# Patient Record
Sex: MALE | Race: WHITE | NOT HISPANIC OR LATINO | Employment: FULL TIME | URBAN - METROPOLITAN AREA
[De-identification: names, ages, dates, MRNs, and addresses within clinical notes are randomized per-mention and may not be internally consistent; named-entity substitution may affect disease eponyms.]

---

## 2017-01-06 ENCOUNTER — ALLSCRIPTS OFFICE VISIT (OUTPATIENT)
Dept: OTHER | Facility: OTHER | Age: 27
End: 2017-01-06

## 2017-12-05 ENCOUNTER — OFFICE VISIT (OUTPATIENT)
Dept: URGENT CARE | Facility: MEDICAL CENTER | Age: 27
End: 2017-12-05
Payer: COMMERCIAL

## 2017-12-05 PROCEDURE — 99213 OFFICE O/P EST LOW 20 MIN: CPT

## 2017-12-09 NOTE — PROGRESS NOTES
Assessment    1  Acute URI (465 9) (J06 9)    Plan  Acute URI    · Start: Azithromycin 250 MG Oral Tablet; TAKE 2 TABLETS ON DAY 1 THEN TAKE 1TABLET A DAY FOR 4 DAYS   · Start: Bromfed DM 30-2-10 MG/5ML Oral Syrup; SWALLOW 10 ML Every 6 hours PRN Forcough/congestion    Discussion/Summary  Discussion Summary:   Take medications as prescribed and as directed  fluids and rest  Warm saltwater gargles, hot tea with honey and lemon, throat lozenges, Chloraseptic spray as needed for sore throat relief  Tylenol and Advil as needed for fever and chills  Monitor for severe worsening of current symptoms, difficulty breathing, swallowing, uncontrollable fever or chills  With these symptoms follow up with PCP or ER immediately  Otherwise follow-up with PCP in 3-5 days if symptoms are not improving  Medication Side Effects Reviewed: Possible side effects of new medications were reviewed with the patient/guardian today  Understands and agrees with treatment plan: The treatment plan was reviewed with the patient/guardian  The patient/guardian understands and agrees with the treatment plan   Counseling Documentation With Imm: The patient, patient's family was counseled regarding instructions for management  Follow Up Instructions: Follow Up with your Primary Care Provider in 3-5 days  If your symptoms worsen, go to the nearest [place] [address] Emergency Department  Chief Complaint  Chief Complaint Free Text Note Form: Cold sx x 1 5week  History of Present Illness  HPI: 39-year-old male with a past medical history of asthma presents for evaluation of sore throat, productive cough x1 and half weeks  Patient is currently taking Dorisann Crooked as a maintenance medication for his asthma  He has not needed to use his rescue inhaler  Patient states that he has per been producing a productive green/ yellow mucus  Has been feeling short of breath and chest congestion  Denies any fevers, nasal congestion, nasal tenderness, runny nose   He states that he does have a sore throat, which is exacerbated with coughing  Patient denies any wheezing episodes Or symptoms of respiratory distress  He denies any chest pain, abdominal pain, nausea/ vomiting [facility] Based Practices Required Assessment:  Pain Assessment  the patient states they have pain  The pain is located in the throat  (on a scale of 0 to 10, the patient rates the pain at 6 ) Reason DV Screen not done: not alone       Review of Systems  Focused-Male:  Constitutional: fever, but-- no chills  ENT: sore throat, but-- no earache-- and-- no nasal discharge  Cardiovascular: no chest pain-- and-- no palpitations  Respiratory: shortness of breath-- and-- cough, but-- no wheezing  Gastrointestinal: no abdominal pain,-- no nausea,-- no vomiting-- and-- no diarrhea  ROS Reviewed:   ROS reviewed  Active Problems  1  Allergic rhinitis (477 9) (J30 9)  2  Eustachian tube dysfunction (381 81) (H69 80)  3  Moderate persistent asthma (493 90) (J45 40)  4  Otitis media (382 9) (H66 90)  5  Sore throat (462) (J02 9)    Past Medical History  1  History of Acute bronchitis (466 0) (J20 9)  2  History of Acute sinusitis (461 9) (J01 90)  Active Problems And Past Medical History Reviewed: The active problems and past medical history were reviewed and updated today  Family History  Family History   1  No family history of mental disorder  Family History Reviewed: The family history was reviewed and updated today  Social History     · Denied: History of Alcohol Use (History)   · Denied: History of Drug Use   · Former smoker (V15 82) (J29 179)   · Never a smoker   · No alcohol use   · No drug use   · No secondhand smoke exposure (V49 89) (Z78 9)  Social History Reviewed: The social history was reviewed and updated today  The social history was reviewed and is unchanged  Surgical History  Surgical History Reviewed: The surgical history was reviewed and updated today  Current Meds  1  Azelastine HCl - 0 1 % Nasal Solution; USE 2 SPRAYS IN EACH       NOSTRIL TWICE DAILY; Therapy: [id number] to (Evaluate:04Jan2018)  Requested for: 34QOO5695; Last Rx:09Jan2017 Ordered  2  Dulera 200-5 MCG/ACT Inhalation Aerosol; INHALE 2 PUFFS TWICE DAILY  RINSE MOUTH AFTER USE; Therapy: [id number] to (Evaluate:04Jan2018)  Requested for: 03JFK8217; Last Rx:09Jan2017 Ordered  3  Montelukast Sodium 10 MG Oral Tablet; Take 1 tablet daily; Therapy: 65JAY5390 to (LDFORJDO:75TTM1289)  Requested for: 20TSY1987; Last Rx:09Jan2017 Ordered  4  ProAir  (90 Base) MCG/ACT Inhalation Aerosol Solution; INHALE 1 TO 2 PUFFS EVERY 4 TO 6 HOURS AS NEEDED; Therapy: 59KPA1511 to (Last EN:02QTX6644)  Requested for: 65PQW6773 Ordered  5  Zyrtec 10 MG TABS; Take 1 tablet daily Recorded  Medication List Reviewed: The medication list was reviewed and updated today  Allergies    1  No Known Drug Allergies    Vitals  Signs   Recorded: 00RGZ3343 01:52PM   Temperature: 97 1 F  Heart Rate: 80  Respiration: 18  Systolic: 969  Diastolic: 84  BP Cuff Size: Extra-Large  BP Comments: manual  Height: 6 ft 0 5 in  Weight: 230 lb   BMI Calculated: 30 77  BSA Calculated: 2 27  O2 Saturation: 98  Pain Scale: 6    Physical Exam   Constitutional  General appearance: No acute distress, well appearing and well nourished  Eyes  Conjunctiva and lids: No swelling, erythema, or discharge  Pupils and irises: Equal, round and reactive to light  Ears, Nose, Mouth, and Throat  External inspection of ears and nose: Normal    Otoscopic examination: Tympanic membrance translucent with normal light reflex  Canals patent without erythema  Nasal mucosa, septum, and turbinates: Normal without edema or erythema  Oropharynx: Normal with no erythema, edema, exudate or lesions  Pulmonary  Respiratory effort: No increased work of breathing or signs of respiratory distress  Auscultation of lungs: Clear to auscultation     Cardiovascular Palpation of heart: Normal PMI, no thrills  Auscultation of heart: Normal rate and rhythm, normal S1 and S2, without murmurs  Psychiatric  Orientation to person, place and time: Normal    Mood and affect: Normal        Message  Return to work or school:   Adrian Nicole is under my professional care  He was seen in my office on 12/05/2017   He is able to return to work on  12/06/2017      Khalida Nolen PA-C        Signatures   Electronically signed by : Khalida Nolen, [facility]; Dec  5 2017  2:22PM EST                       (Author)    Electronically signed by : TEMO Patton ; Dec  8 2017  8:46PM EST

## 2018-01-15 VITALS
SYSTOLIC BLOOD PRESSURE: 124 MMHG | HEIGHT: 73 IN | BODY MASS INDEX: 33.17 KG/M2 | DIASTOLIC BLOOD PRESSURE: 76 MMHG | WEIGHT: 250.25 LBS | HEART RATE: 76 BPM

## 2018-01-23 VITALS
BODY MASS INDEX: 30.48 KG/M2 | OXYGEN SATURATION: 98 % | WEIGHT: 230 LBS | HEART RATE: 80 BPM | HEIGHT: 73 IN | DIASTOLIC BLOOD PRESSURE: 84 MMHG | SYSTOLIC BLOOD PRESSURE: 114 MMHG | RESPIRATION RATE: 18 BRPM | TEMPERATURE: 97.1 F

## 2018-01-24 NOTE — MISCELLANEOUS
Message  Return to work or school:   Ashly Turcios is under my professional care  He was seen in my office on 12/05/2017   He is able to return to work on  12/06/2017       Cholo Jennings PA-C        Signatures   Electronically signed by : Cholo Jennings [facility]; Dec  5 2017  2:22PM EST                       (Author)    Electronically signed by : Cholo Jennings [facility]; Dec  5 2017  2:36PM EST                       (Author)

## 2018-02-18 DIAGNOSIS — J45.30 MILD PERSISTENT ASTHMA, UNSPECIFIED WHETHER COMPLICATED: Primary | ICD-10-CM

## 2018-02-18 PROBLEM — J45.40 MODERATE PERSISTENT ASTHMA: Status: ACTIVE | Noted: 2017-01-06

## 2018-02-18 RX ORDER — MOMETASONE FUROATE AND FORMOTEROL FUMARATE DIHYDRATE 200; 5 UG/1; UG/1
AEROSOL RESPIRATORY (INHALATION)
Qty: 39 G | Refills: 3 | Status: SHIPPED | OUTPATIENT
Start: 2018-02-18 | End: 2018-02-20 | Stop reason: CLARIF

## 2018-02-20 ENCOUNTER — TELEPHONE (OUTPATIENT)
Dept: FAMILY MEDICINE CLINIC | Facility: CLINIC | Age: 28
End: 2018-02-20

## 2018-02-20 DIAGNOSIS — J45.909 ASTHMA, UNSPECIFIED ASTHMA SEVERITY, UNSPECIFIED WHETHER COMPLICATED, UNSPECIFIED WHETHER PERSISTENT: Primary | ICD-10-CM

## 2018-02-20 RX ORDER — BUDESONIDE AND FORMOTEROL FUMARATE DIHYDRATE 160; 4.5 UG/1; UG/1
2 AEROSOL RESPIRATORY (INHALATION) 2 TIMES DAILY
Qty: 3 INHALER | Refills: 0 | Status: SHIPPED | OUTPATIENT
Start: 2018-02-20 | End: 2018-06-09 | Stop reason: SDUPTHER

## 2018-02-20 NOTE — TELEPHONE ENCOUNTER
I would recommend Symbicort 160 mcg inhaler, 2 puffs twice daily in place of the [facility]

## 2018-03-08 ENCOUNTER — OFFICE VISIT (OUTPATIENT)
Dept: FAMILY MEDICINE CLINIC | Facility: CLINIC | Age: 28
End: 2018-03-08
Payer: COMMERCIAL

## 2018-03-08 VITALS
TEMPERATURE: 97 F | DIASTOLIC BLOOD PRESSURE: 72 MMHG | BODY MASS INDEX: 29.16 KG/M2 | WEIGHT: 247 LBS | RESPIRATION RATE: 16 BRPM | HEIGHT: 77 IN | SYSTOLIC BLOOD PRESSURE: 120 MMHG

## 2018-03-08 DIAGNOSIS — R53.83 FATIGUE, UNSPECIFIED TYPE: ICD-10-CM

## 2018-03-08 DIAGNOSIS — R42 LIGHTHEADED: ICD-10-CM

## 2018-03-08 DIAGNOSIS — A08.4 VIRAL GASTROENTERITIS: Primary | ICD-10-CM

## 2018-03-08 DIAGNOSIS — R11.2 NAUSEA AND VOMITING, INTRACTABILITY OF VOMITING NOT SPECIFIED, UNSPECIFIED VOMITING TYPE: ICD-10-CM

## 2018-03-08 PROCEDURE — 99214 OFFICE O/P EST MOD 30 MIN: CPT | Performed by: FAMILY MEDICINE

## 2018-03-08 PROCEDURE — 1036F TOBACCO NON-USER: CPT | Performed by: FAMILY MEDICINE

## 2018-03-08 PROCEDURE — 3008F BODY MASS INDEX DOCD: CPT | Performed by: FAMILY MEDICINE

## 2018-03-08 RX ORDER — ONDANSETRON 8 MG/1
8 TABLET, ORALLY DISINTEGRATING ORAL EVERY 8 HOURS PRN
Qty: 10 TABLET | Refills: 0 | Status: SHIPPED | OUTPATIENT
Start: 2018-03-08 | End: 2019-01-24 | Stop reason: ALTCHOICE

## 2018-03-08 RX ORDER — CETIRIZINE HYDROCHLORIDE 10 MG/1
1 TABLET ORAL DAILY
COMMUNITY

## 2018-03-08 RX ORDER — MONTELUKAST SODIUM 10 MG/1
1 TABLET ORAL DAILY
COMMUNITY
Start: 2016-03-03 | End: 2019-10-18 | Stop reason: SDUPTHER

## 2018-03-08 RX ORDER — AZELASTINE 1 MG/ML
2 SPRAY, METERED NASAL 2 TIMES DAILY
COMMUNITY
Start: 2016-05-18

## 2018-03-08 NOTE — LETTER
March 8, 2018     Patient: Narcisa Mancilla   YOB: 1990   Date of Visit: 3/8/2018       To Whom it May Concern: Narcisa Mancilla is under my professional care  He was seen in my office on 3/8/2018  He may return to work on 03/12/2018  If you have any questions or concerns, please don't hesitate to call           Sincerely,          Tito Ventura DO        CC: No Recipients

## 2018-03-08 NOTE — PROGRESS NOTES
Assessment/Plan:  1  Acute viral gastroenteritis, increase p o  fluids, clear liquids, patient use Tylenol if needed  2  Nausea vomiting, Zofran was ordered patient use half strained Gatorade for 24 hours advanced diet as tolerated  3  Fatigue, secondary to above  4  Lightheaded, secondary to above patient should feel better with more fluids  5  Allergic rhinitis, OTC Zyrtec can be used  6  Patient to return in 3-4 days if still symptoms, symptoms worsen report to [address]  Allergic rhinitis  May use OTC Zyrtec if needed       Diagnoses and all orders for this visit:    Viral gastroenteritis  -     ondansetron (ZOFRAN-ODT) 8 mg disintegrating tablet; Take 1 tablet (8 mg total) by mouth every 8 (eight) hours as needed for nausea or vomiting for up to 3 days    Fatigue, unspecified type  -     ondansetron (ZOFRAN-ODT) 8 mg disintegrating tablet; Take 1 tablet (8 mg total) by mouth every 8 (eight) hours as needed for nausea or vomiting for up to 3 days    Nausea and vomiting, intractability of vomiting not specified, unspecified vomiting type  -     ondansetron (ZOFRAN-ODT) 8 mg disintegrating tablet; Take 1 tablet (8 mg total) by mouth every 8 (eight) hours as needed for nausea or vomiting for up to 3 days    Lightheaded  -     ondansetron (ZOFRAN-ODT) 8 mg disintegrating tablet; Take 1 tablet (8 mg total) by mouth every 8 (eight) hours as needed for nausea or vomiting for up to 3 days          Subjective:      Patient ID: Torito Clement is a 33 y o  male  Cc: nausea, vomiting, fatigue, dizzy x 4 days  [facility]    Patient for the last 4 days his fatigue with nausea and vomiting denies fever chills no abdominal pain diarrhea  No medication has been taken  Has been getting mildly lightheaded at times    But no syncope or near-syncope no headache or true vertigo        The following portions of the patient's history were reviewed and updated as appropriate: allergies, current medications, past family history, past medical history, past social history, past surgical history and problem list     Review of Systems   Constitutional:        HPI   HENT: Positive for rhinorrhea and sneezing  Eyes: Negative  Respiratory: Negative  Cardiovascular: Negative  Gastrointestinal:        HPI   Genitourinary: Negative  Musculoskeletal: Negative  Allergic/Immunologic: Negative  Neurological:        HPI   Psychiatric/Behavioral: Negative  Objective:      Vitals:    03/08/18 1837 03/08/18 1843   BP: 120/72    BP Location: Left arm    Patient Position: Sitting    Cuff Size: Large    Resp: 16    Temp:  (!) 97 °F (36 1 °C)   Weight: 112 kg (247 lb)    Height: 6' 5" (1 956 m)           Physical Exam   Constitutional: He is oriented to person, place, and time  He appears well-developed and well-nourished  HENT:   Head: Normocephalic and atraumatic  Right Ear: External ear normal    Left Ear: External ear normal    Mouth/Throat: No oropharyngeal exudate  Positive allergic turbinates negative sinus tenderness to percussion scant clear postnasal drip negative pharyngeal injection or exudates negative cervical adenopathy   Eyes: Conjunctivae are normal  Pupils are equal, round, and reactive to light  No scleral icterus  Neck: Neck supple  Cardiovascular: Normal rate and regular rhythm  Pulmonary/Chest: Effort normal and breath sounds normal    Abdominal: Soft  Bowel sounds are normal  He exhibits no distension and no mass  There is no tenderness  There is no rebound and no guarding  Musculoskeletal: He exhibits no edema  Lymphadenopathy:     He has no cervical adenopathy  Neurological: He is alert and oriented to person, place, and time  No cranial nerve deficit  He exhibits normal muscle tone  Coordination normal    Skin: Skin is warm and dry  Psychiatric: He has a normal mood and affect

## 2018-03-08 NOTE — PATIENT INSTRUCTIONS
Clear liquids for 24 hours, 1/2 strength Gatorade, if not improving in 4872 hours call office, if worsen report to [facility]

## 2018-06-09 DIAGNOSIS — J45.909 ASTHMA, UNSPECIFIED ASTHMA SEVERITY, UNSPECIFIED WHETHER COMPLICATED, UNSPECIFIED WHETHER PERSISTENT: ICD-10-CM

## 2018-06-11 RX ORDER — BUDESONIDE AND FORMOTEROL FUMARATE DIHYDRATE 160; 4.5 UG/1; UG/1
AEROSOL RESPIRATORY (INHALATION)
Qty: 30.6 G | Refills: 0 | Status: SHIPPED | OUTPATIENT
Start: 2018-06-11 | End: 2018-10-01 | Stop reason: SDUPTHER

## 2018-10-01 DIAGNOSIS — J45.909 ASTHMA, UNSPECIFIED ASTHMA SEVERITY, UNSPECIFIED WHETHER COMPLICATED, UNSPECIFIED WHETHER PERSISTENT: ICD-10-CM

## 2018-10-01 RX ORDER — BUDESONIDE AND FORMOTEROL FUMARATE DIHYDRATE 160; 4.5 UG/1; UG/1
2 AEROSOL RESPIRATORY (INHALATION) 2 TIMES DAILY
Qty: 30.6 G | Refills: 1 | Status: SHIPPED | OUTPATIENT
Start: 2018-10-01 | End: 2019-01-24 | Stop reason: SDUPTHER

## 2019-01-22 ENCOUNTER — OFFICE VISIT (OUTPATIENT)
Dept: URGENT CARE | Age: 29
End: 2019-01-22
Payer: COMMERCIAL

## 2019-01-22 VITALS
DIASTOLIC BLOOD PRESSURE: 78 MMHG | HEIGHT: 72 IN | BODY MASS INDEX: 32.1 KG/M2 | OXYGEN SATURATION: 96 % | WEIGHT: 237 LBS | TEMPERATURE: 99.5 F | HEART RATE: 72 BPM | RESPIRATION RATE: 18 BRPM | SYSTOLIC BLOOD PRESSURE: 131 MMHG

## 2019-01-22 DIAGNOSIS — J20.9 ACUTE BRONCHITIS, UNSPECIFIED ORGANISM: Primary | ICD-10-CM

## 2019-01-22 DIAGNOSIS — J45.901 ASTHMA WITH ACUTE EXACERBATION, UNSPECIFIED ASTHMA SEVERITY, UNSPECIFIED WHETHER PERSISTENT: ICD-10-CM

## 2019-01-22 PROCEDURE — G0382 LEV 3 HOSP TYPE B ED VISIT: HCPCS | Performed by: PHYSICIAN ASSISTANT

## 2019-01-22 RX ORDER — BROMPHENIRAMINE MALEATE, PSEUDOEPHEDRINE HYDROCHLORIDE, AND DEXTROMETHORPHAN HYDROBROMIDE 2; 30; 10 MG/5ML; MG/5ML; MG/5ML
5 SYRUP ORAL 4 TIMES DAILY PRN
Qty: 120 ML | Refills: 0 | Status: SHIPPED | OUTPATIENT
Start: 2019-01-22 | End: 2019-09-23

## 2019-01-22 RX ORDER — PREDNISONE 20 MG/1
20 TABLET ORAL DAILY
Qty: 5 TABLET | Refills: 0 | Status: SHIPPED | OUTPATIENT
Start: 2019-01-22 | End: 2019-01-27

## 2019-01-22 RX ORDER — GUAIFENESIN 600 MG
600 TABLET, EXTENDED RELEASE 12 HR ORAL EVERY 12 HOURS SCHEDULED
Qty: 20 TABLET | Refills: 0 | Status: SHIPPED | OUTPATIENT
Start: 2019-01-22 | End: 2020-10-12 | Stop reason: ALTCHOICE

## 2019-01-22 NOTE — LETTER
January 22, 2019     Patient: Kevin Torres   YOB: 1990   Date of Visit: 1/22/2019       To Whom It May Concern: It is my medical opinion that Kevin Torres may return to work on 1/24/2019  If you have any questions or concerns, please don't hesitate to call           Sincerely,        Karli Maddox PA-C    CC: No Recipients

## 2019-01-23 NOTE — PATIENT INSTRUCTIONS
Take Bromfed DM, prednisone and mucinex as prescribed  Fluids and rest (Warm water with honey and lemon)  Tylenol/Ibuprofen for pain fever  Follow up with PCP in 3-5 days  Proceed to  ER if symptoms worsen  Acute Bronchitis   WHAT YOU NEED TO KNOW:   Acute bronchitis is swelling and irritation in the air passages of your lungs  This irritation may cause you to cough or have other breathing problems  Acute bronchitis often starts because of another illness, such as a cold or the flu  The illness spreads from your nose and throat to your windpipe and airways  Bronchitis is often called a chest cold  Acute bronchitis lasts about 3 to 6 weeks and is usually not a serious illness  Your cough can last for several weeks  DISCHARGE INSTRUCTIONS:   Return to the emergency department if:   · You cough up blood  · Your lips or fingernails turn blue  · You feel like you are not getting enough air when you breathe  Contact your healthcare provider if:   · You have a fever  · Your breathing problems do not go away or get worse  · Your cough does not get better within 4 weeks  · You have questions or concerns about your condition or care  Self-care:   · Get more rest   Rest helps your body to heal  Slowly start to do more each day  Rest when you feel it is needed  · Avoid irritants in the air  Avoid chemicals, fumes, and dust  Wear a face mask if you must work around dust or fumes  Stay inside on days when air pollution levels are high  If you have allergies, stay inside when pollen counts are high  Do not use aerosol products, such as spray-on deodorant, bug spray, and hair spray  · Do not smoke or be around others who smoke  Nicotine and other chemicals in cigarettes and cigars damages the cilia that move mucus out of your lungs  Ask your healthcare provider for information if you currently smoke and need help to quit  E-cigarettes or smokeless tobacco still contain nicotine   Talk to your healthcare provider before you use these products  · Drink liquids as directed  Liquids help keep your air passages moist and help you cough up mucus  You may need to drink more liquids when you have acute bronchitis  Ask how much liquid to drink each day and which liquids are best for you  · Use a humidifier or vaporizer  Use a cool mist humidifier or a vaporizer to increase air moisture in your home  This may make it easier for you to breathe and help decrease your cough  Decrease risk for acute bronchitis:   · Get the vaccinations you need  Ask your healthcare provider if you should get vaccinated against the flu or pneumonia  · Prevent the spread of germs  You can decrease your risk of acute bronchitis and other illnesses by doing the following:     [facility] your hands often with soap and water  Carry germ-killing hand lotion or gel with you  You can use the lotion or gel to clean your hands when soap and water are not available  ¨ Do not touch your eyes, nose, or mouth unless you have washed your hands first     ¨ Always cover your mouth when you cough to prevent the spread of germs  It is best to cough into a tissue or your shirt sleeve instead of into your hand  Ask those around you cover their mouths when they cough  ¨ Try to avoid people who have a cold or the flu  If you are sick, stay away from others as much as possible  Medicines: Your healthcare provider may  give you any of the following:  · Ibuprofen or acetaminophen  are medicines that help lower your fever  They are available without a doctor's order  Ask your healthcare provider which medicine is right for you  Ask how much to take and how often to take it  Follow directions  These medicines can cause stomach bleeding if not taken correctly  Ibuprofen can cause kidney damage  Do not take ibuprofen if you have kidney disease, an ulcer, or allergies to aspirin  Acetaminophen can cause liver damage   Do not take more than 4,000 milligrams in 24 hours  · Decongestants  help loosen mucus in your lungs and make it easier to cough up  This can help you breathe easier  · Cough suppressants  decrease your urge to cough  If your cough produces mucus, do not take a cough suppressant unless your healthcare provider tells you to  Your healthcare provider may suggest that you take a cough suppressant at night so you can rest     · Inhalers  may be given  Your healthcare provider may give you one or more inhalers to help you breathe easier and cough less  An inhaler gives your medicine to open your airways  Ask your healthcare provider to show you how to use your inhaler correctly  · Take your medicine as directed  Contact your healthcare provider if you think your medicine is not helping or if you have side effects  Tell him of her if you are allergic to any medicine  Keep a list of the medicines, vitamins, and herbs you take  Include the amounts, and when and why you take them  Bring the list or the pill bottles to follow-up visits  Carry your medicine list with you in case of an emergency  Follow up with your healthcare provider as directed:  Write down questions you have so you will remember to ask them during your follow-up visits  © 2017 [employer] Information is for End User's use only and may not be sold, redistributed or otherwise used for commercial purposes  All illustrations and images included in CareNotes® are the copyrighted property of A D A M [employer] Inc  or [employer]  The above information is an  only  It is not intended as medical advice for individual conditions or treatments  Talk to your doctor, nurse or pharmacist before following any medical regimen to see if it is safe and effective for you

## 2019-01-23 NOTE — PROGRESS NOTES
[facility]        NAME: Joaquin Gottron is a 26 y o  male  : 1990    MRN: [id number]  DATE: 2019  TIME: 7:39 PM    Assessment and Plan   Acute bronchitis, unspecified organism [J20 9]  1  Acute bronchitis, unspecified organism  brompheniramine-pseudoephedrine-DM 30-2-10 MG/5ML syrup    guaiFENesin (MUCINEX) 600 mg 12 hr tablet   2  Asthma with acute exacerbation, unspecified asthma severity, unspecified whether persistent  predniSONE 20 mg tablet         Patient Instructions     Take Bromfed DM, prednisone and mucinex as prescribed  Fluids and rest (Warm water with honey and lemon)  Tylenol/Ibuprofen for pain fever  Follow up with PCP in 3-5 days  Proceed to  ER if symptoms worsen  Chief Complaint     Chief Complaint   Patient presents with    Cough     Productive cough, wheezing  Onset 1 day ago         History of Present Illness       Cough   This is a new problem  The current episode started yesterday  The problem has been unchanged  The problem occurs every few minutes  The cough is productive of sputum  Associated symptoms include shortness of breath and wheezing  Pertinent negatives include no chest pain, chills, ear pain, fever, headaches, myalgias, postnasal drip, rash, rhinorrhea or sore throat  He has tried a beta-agonist inhaler for the symptoms  The treatment provided mild relief  His past medical history is significant for asthma and bronchitis  There is no history of pneumonia  Review of Systems   Review of Systems   Constitutional: Negative for activity change, appetite change, chills and fever  HENT: Negative for congestion, dental problem, ear discharge, ear pain, facial swelling, postnasal drip, rhinorrhea, sinus pain, sinus pressure, sneezing, sore throat and trouble swallowing  Eyes: Negative for itching  Respiratory: Positive for cough (productive), shortness of breath and wheezing  Negative for chest tightness      Cardiovascular: Negative for chest pain and palpitations  Gastrointestinal: Negative for abdominal pain, constipation, diarrhea, nausea and vomiting  Musculoskeletal: Negative for myalgias  Skin: Negative for rash  Neurological: Negative for dizziness, weakness, light-headedness and headaches  Current Medications       Current Outpatient Prescriptions:     azelastine (ASTELIN) 0 1 % nasal spray, 2 sprays into each nostril 2 (two) times a day, Disp: , Rfl:     brompheniramine-pseudoephedrine-DM 30-2-10 MG/5ML syrup, Take 5 mL by mouth 4 (four) times a day as needed for cough, Disp: 120 mL, Rfl: 0    budesonide-formoterol (SYMBICORT) 160-4 5 mcg/act inhaler, Inhale 2 puffs 2 (two) times a day Rinse mouth after use  (Patient not taking: Reported on 1/22/2019 ), Disp: 30 6 g, Rfl: 1    cetirizine (ZYRTEC ALLERGY) 10 mg tablet, Take 1 tablet by mouth daily, Disp: , Rfl:     guaiFENesin (MUCINEX) 600 mg 12 hr tablet, Take 1 tablet (600 mg total) by mouth every 12 (twelve) hours, Disp: 20 tablet, Rfl: 0    montelukast (SINGULAIR) 10 mg tablet, Take 1 tablet by mouth daily, Disp: , Rfl:     ondansetron (ZOFRAN-ODT) 8 mg disintegrating tablet, Take 1 tablet (8 mg total) by mouth every 8 (eight) hours as needed for nausea or vomiting for up to 3 days, Disp: 10 tablet, Rfl: 0    predniSONE 20 mg tablet, Take 1 tablet (20 mg total) by mouth daily for 5 days, Disp: 5 tablet, Rfl: 0    Current Allergies     Allergies as of 01/22/2019    (No Known Allergies)            The following portions of the patient's history were reviewed and updated as appropriate: allergies, current medications, past family history, past medical history, past social history, past surgical history and problem list      History reviewed  No pertinent past medical history  History reviewed  No pertinent surgical history  History reviewed  No pertinent family history  Medications have been verified          Objective   /78   Pulse 72   Temp 99 5 °F (37 5 °C)   Resp 18   Ht 6' (1 829 m)   Wt 108 kg (237 lb)   SpO2 96%   BMI 32 14 kg/m²        Physical Exam     Physical Exam   Constitutional: He is oriented to person, place, and time  He appears well-developed and well-nourished  No distress  HENT:   Head: Normocephalic  Right Ear: External ear normal    Left Ear: External ear normal    Nose: Nose normal    Mouth/Throat: Oropharynx is clear and moist  No oropharyngeal exudate  Eyes: Conjunctivae are normal    Cardiovascular: Normal rate, regular rhythm, normal heart sounds and intact distal pulses  Exam reveals no gallop and no friction rub  No murmur heard  Pulmonary/Chest: Effort normal and breath sounds normal  No respiratory distress  He has no wheezes  He has no rales  He exhibits no tenderness  Lymphadenopathy:     He has no cervical adenopathy  Neurological: He is alert and oriented to person, place, and time  Skin: Skin is warm  He is not diaphoretic  Psychiatric: He has a normal mood and affect  His behavior is normal  Judgment and thought content normal    Vitals reviewed

## 2019-01-24 ENCOUNTER — OFFICE VISIT (OUTPATIENT)
Dept: FAMILY MEDICINE CLINIC | Facility: CLINIC | Age: 29
End: 2019-01-24
Payer: COMMERCIAL

## 2019-01-24 VITALS
HEART RATE: 68 BPM | DIASTOLIC BLOOD PRESSURE: 76 MMHG | WEIGHT: 234.2 LBS | BODY MASS INDEX: 31.72 KG/M2 | SYSTOLIC BLOOD PRESSURE: 140 MMHG | HEIGHT: 72 IN | TEMPERATURE: 99.1 F

## 2019-01-24 DIAGNOSIS — J45.41 MODERATE PERSISTENT ASTHMA WITH ACUTE EXACERBATION: Primary | ICD-10-CM

## 2019-01-24 DIAGNOSIS — J45.909 ASTHMA, UNSPECIFIED ASTHMA SEVERITY, UNSPECIFIED WHETHER COMPLICATED, UNSPECIFIED WHETHER PERSISTENT: ICD-10-CM

## 2019-01-24 DIAGNOSIS — J06.9 ACUTE UPPER RESPIRATORY INFECTION: ICD-10-CM

## 2019-01-24 PROCEDURE — 3008F BODY MASS INDEX DOCD: CPT | Performed by: PHYSICIAN ASSISTANT

## 2019-01-24 PROCEDURE — 99214 OFFICE O/P EST MOD 30 MIN: CPT | Performed by: PHYSICIAN ASSISTANT

## 2019-01-24 PROCEDURE — 1036F TOBACCO NON-USER: CPT | Performed by: PHYSICIAN ASSISTANT

## 2019-01-24 RX ORDER — BUDESONIDE AND FORMOTEROL FUMARATE DIHYDRATE 160; 4.5 UG/1; UG/1
2 AEROSOL RESPIRATORY (INHALATION) 2 TIMES DAILY
Qty: 1 INHALER | Refills: 11 | Status: SHIPPED | OUTPATIENT
Start: 2019-01-24 | End: 2020-02-10

## 2019-01-24 RX ORDER — AZITHROMYCIN 250 MG/1
TABLET, FILM COATED ORAL
Qty: 6 TABLET | Refills: 0 | Status: SHIPPED | OUTPATIENT
Start: 2019-01-24 | End: 2019-01-28

## 2019-01-24 NOTE — PATIENT INSTRUCTIONS
Problem List Items Addressed This Visit        Respiratory    Moderate persistent asthma - Primary     Continue and finsish prednisone from urgent care  Relevant Medications    budesonide-formoterol (SYMBICORT) 160-4 5 mcg/act inhaler    Albuterol Sulfate 108 (90 Base) MCG/ACT AEPB    Acute upper respiratory infection     Add antibiotic as directed            Relevant Medications    azithromycin (ZITHROMAX) 250 mg tablet      Other Visit Diagnoses     Asthma, unspecified asthma severity, unspecified whether complicated, unspecified whether persistent        Relevant Medications    budesonide-formoterol (SYMBICORT) 160-4 5 mcg/act inhaler    Albuterol Sulfate 108 (90 Base) MCG/ACT AEPB

## 2019-01-24 NOTE — LETTER
January 24, 2019     Patient: Venkatesh Kraus   YOB: 1990   Date of Visit: 1/24/2019       To Whom it May Concern: Venkatesh Kraus is under my professional care  He was seen in my office on 1/24/2019  He may return to work on 1/25/19  If you have any questions or concerns, please don't hesitate to call           Sincerely,          Valeri Garcia PA-C        CC: No Recipients

## 2019-01-24 NOTE — PROGRESS NOTES
Assessment/Plan:    Acute upper respiratory infection  Add antibiotic as directed  Moderate persistent asthma  Continue and finsish prednisone from urgent care  Diagnoses and all orders for this visit:    Moderate persistent asthma with acute exacerbation  -     Albuterol Sulfate 108 (90 Base) MCG/ACT AEPB; Inhale 2 sprays every 4 (four) hours as needed (wheeze)    Acute upper respiratory infection  -     azithromycin (ZITHROMAX) 250 mg tablet; Take two tablets on day one and then one tablet daily for the next four days  Asthma, unspecified asthma severity, unspecified whether complicated, unspecified whether persistent  -     budesonide-formoterol (SYMBICORT) 160-4 5 mcg/act inhaler; Inhale 2 puffs 2 (two) times a day Rinse mouth after use  Subjective:   CC: 2 day follow up for bronchitis and cough, Pt  Was seen Tuesday at urgent and started on prednisone and cough medication  Pt  Still having cough and chest congestion  [facility]      Patient ID: Jamie Alvarez is a 26 y o  male  Monday night started with cough and mucus in his upper chest which hurts to cough  Productive of yellow ill tasting mucus  Fever , No N/V/D, decreased appetite  Wife and son are sick  Takes symbicort regularly and as needed inhaler does not seem to be helping  He needs refills of his inhalers and a note for work  The following portions of the patient's history were reviewed and updated as appropriate: allergies, current medications, past family history, past medical history, past social history, past surgical history and problem list     Review of Systems   Constitutional: Negative  HENT: Positive for congestion, postnasal drip, rhinorrhea and sinus pressure  Eyes: Negative  Respiratory: Positive for cough, chest tightness and wheezing  Cardiovascular: Negative  Gastrointestinal: Negative  Endocrine: Negative  Genitourinary: Negative  Musculoskeletal: Negative  Skin: Negative  Allergic/Immunologic: Negative  Neurological: Negative  Hematological: Negative  Psychiatric/Behavioral: Negative  Objective:      Vitals:    01/24/19 1401   BP: 140/76   BP Location: Left arm   Patient Position: Sitting   Pulse: 68   Temp: 99 1 °F (37 3 °C)   TempSrc: Tympanic   Weight: 106 kg (234 lb 3 2 oz)   Height: 6' (1 829 m)            Physical Exam   Constitutional: He is oriented to person, place, and time  He appears well-developed and well-nourished  No distress  HENT:   Head: Normocephalic and atraumatic  Right Ear: Hearing, tympanic membrane, external ear and ear canal normal    Left Ear: Hearing, tympanic membrane and external ear normal    Nose: Mucosal edema and rhinorrhea present  Mouth/Throat: Posterior oropharyngeal edema and posterior oropharyngeal erythema present  Eyes: Conjunctivae are normal  Right eye exhibits no discharge  Left eye exhibits no discharge  Neck: Carotid bruit is not present  Cardiovascular: Normal rate, regular rhythm and normal heart sounds  Exam reveals no gallop and no friction rub  No murmur heard  Pulmonary/Chest: Effort normal and breath sounds normal  No respiratory distress  He has no wheezes  He has no rales  Lymphadenopathy:     He has cervical adenopathy  Right cervical: Superficial cervical adenopathy present  Left cervical: Superficial cervical adenopathy present  Neurological: He is alert and oriented to person, place, and time  Skin: Skin is warm and dry  He is not diaphoretic  Psychiatric: He has a normal mood and affect  Judgment normal    Nursing note and vitals reviewed

## 2019-09-23 ENCOUNTER — OFFICE VISIT (OUTPATIENT)
Dept: FAMILY MEDICINE CLINIC | Facility: CLINIC | Age: 29
End: 2019-09-23
Payer: COMMERCIAL

## 2019-09-23 VITALS
WEIGHT: 221 LBS | SYSTOLIC BLOOD PRESSURE: 130 MMHG | DIASTOLIC BLOOD PRESSURE: 72 MMHG | BODY MASS INDEX: 29.93 KG/M2 | HEIGHT: 72 IN | TEMPERATURE: 97 F | OXYGEN SATURATION: 99 %

## 2019-09-23 DIAGNOSIS — J45.41 MODERATE PERSISTENT ASTHMA WITH ACUTE EXACERBATION: ICD-10-CM

## 2019-09-23 DIAGNOSIS — J01.40 ACUTE PANSINUSITIS, RECURRENCE NOT SPECIFIED: Primary | ICD-10-CM

## 2019-09-23 DIAGNOSIS — H65.03 BILATERAL ACUTE SEROUS OTITIS MEDIA, RECURRENCE NOT SPECIFIED: ICD-10-CM

## 2019-09-23 DIAGNOSIS — R05.9 COUGH: ICD-10-CM

## 2019-09-23 DIAGNOSIS — H69.80 DYSFUNCTION OF EUSTACHIAN TUBE, UNSPECIFIED LATERALITY: ICD-10-CM

## 2019-09-23 DIAGNOSIS — J30.9 ALLERGIC RHINITIS, UNSPECIFIED SEASONALITY, UNSPECIFIED TRIGGER: ICD-10-CM

## 2019-09-23 PROBLEM — J06.9 ACUTE UPPER RESPIRATORY INFECTION: Status: RESOLVED | Noted: 2019-01-24 | Resolved: 2019-09-23

## 2019-09-23 PROCEDURE — 3008F BODY MASS INDEX DOCD: CPT | Performed by: FAMILY MEDICINE

## 2019-09-23 PROCEDURE — 99214 OFFICE O/P EST MOD 30 MIN: CPT | Performed by: FAMILY MEDICINE

## 2019-09-23 RX ORDER — AZITHROMYCIN 500 MG/1
TABLET, FILM COATED ORAL
Qty: 3 TABLET | Refills: 0 | Status: SHIPPED | OUTPATIENT
Start: 2019-09-23 | End: 2019-09-26

## 2019-09-23 RX ORDER — PREDNISONE 20 MG/1
TABLET ORAL
Qty: 20 TABLET | Refills: 0 | Status: SHIPPED | OUTPATIENT
Start: 2019-09-23 | End: 2019-09-23 | Stop reason: SDUPTHER

## 2019-09-23 RX ORDER — BENZONATATE 200 MG/1
200 CAPSULE ORAL 3 TIMES DAILY PRN
Qty: 30 CAPSULE | Refills: 1 | Status: SHIPPED | OUTPATIENT
Start: 2019-09-23 | End: 2019-10-03

## 2019-09-23 RX ORDER — PREDNISONE 20 MG/1
TABLET ORAL
Qty: 20 TABLET | Refills: 0 | Status: SHIPPED | OUTPATIENT
Start: 2019-09-23 | End: 2019-10-03

## 2019-09-23 RX ORDER — BENZONATATE 200 MG/1
200 CAPSULE ORAL 3 TIMES DAILY PRN
Qty: 30 CAPSULE | Refills: 1 | Status: SHIPPED | OUTPATIENT
Start: 2019-09-23 | End: 2019-09-23 | Stop reason: SDUPTHER

## 2019-09-23 RX ORDER — AZITHROMYCIN 500 MG/1
TABLET, FILM COATED ORAL
Qty: 3 TABLET | Refills: 0 | Status: SHIPPED | OUTPATIENT
Start: 2019-09-23 | End: 2019-09-23 | Stop reason: SDUPTHER

## 2019-09-23 NOTE — PROGRESS NOTES
Assessment and Plan:   1  Acute sinusitis, Zithromax prescribed  2  Serous otitis media prednisone prescribed  3  Eustachian tube dysfunction, prednisone prescribed  4  Per allergic rhinitis, continue present therapy  5  Asthma, stable continue present therapy ordered PFTs for next month  6  Cough  Secondary postnasal drip, Tessalon is ordered  7  BMI of 29 97 discussed diet exercise weight loss recommended  8  Return in 1 week if still symptoms      Problem List Items Addressed This Visit        Respiratory    Moderate persistent asthma      Stable continue present therapy         Relevant Orders    Complete PFT with post bronchodilator    Allergic rhinitis      Continue present therapy, Zyrtec, singular, and Astelin         Relevant Medications    predniSONE 20 mg tablet       Nervous and Auditory    Eustachian tube dysfunction      Prednisone ordered         Relevant Medications    predniSONE 20 mg tablet      Other Visit Diagnoses     Acute pansinusitis, recurrence not specified    -  Primary    Relevant Medications    azithromycin (ZITHROMAX) 500 MG tablet    Bilateral acute serous otitis media, recurrence not specified        Relevant Medications    predniSONE 20 mg tablet    Cough        Relevant Medications    benzonatate (TESSALON) 200 MG capsule                 Diagnoses and all orders for this visit:    Acute pansinusitis, recurrence not specified  -     azithromycin (ZITHROMAX) 500 MG tablet; Take 1 tablet daily for 3 days    Bilateral acute serous otitis media, recurrence not specified  -     predniSONE 20 mg tablet; Take 3 tablets daily for 3 days, 2 tablets daily for 3 days, 1 tablet daily for 4 days  Take with food    Allergic rhinitis, unspecified seasonality, unspecified trigger  -     predniSONE 20 mg tablet; Take 3 tablets daily for 3 days, 2 tablets daily for 3 days, 1 tablet daily for 4 days    Take with food    Dysfunction of Eustachian tube, unspecified laterality  -     predniSONE 20 mg tablet; Take 3 tablets daily for 3 days, 2 tablets daily for 3 days, 1 tablet daily for 4 days  Take with food    Moderate persistent asthma with acute exacerbation  -     Complete PFT with post bronchodilator    Cough  -     benzonatate (TESSALON) 200 MG capsule; Take 1 capsule (200 mg total) by mouth 3 (three) times a day as needed for cough for up to 10 days              Subjective:      Patient ID: Gilbert Rhodes is a 26 y o  male  CC:    Chief Complaint   Patient presents with    Cold Like Symptoms     pt here with complains of sinus pressure, post nasal drip, cough x 4 days  R Cosmo       HPI:     The past 4 days patient has increasing sinus pain and pressure in ears are "itching"  Negative otorrhea or otalgia positive head congestion sneezing postnasal drip positive purulent rhinorrhea  Mild dry cough  Patient denies chest pain shortness breath wheezing or hemoptysis  Patient denies fever chills or night sweats patient is using his allergy / asthma medicine as prescribed      The following portions of the patient's history were reviewed and updated as appropriate: allergies, current medications, past family history, past medical history, past social history, past surgical history and problem list       Review of Systems   Constitutional:         HPI   HENT:         HPI   Eyes: Negative  Respiratory:         HPI   Cardiovascular:         HP   Gastrointestinal: Negative  Endocrine: Negative  Genitourinary: Negative  Musculoskeletal: Negative  Skin: Negative  Allergic/Immunologic: Positive for environmental allergies  Neurological: Negative  Hematological: Negative  Psychiatric/Behavioral: Negative            Data to review:       Objective:    Vitals:    09/23/19 1357 09/23/19 1405   BP: 130/72    BP Location: Left arm    Patient Position: Sitting    Cuff Size: Large    Temp: (!) 97 °F (36 1 °C)    TempSrc: Tympanic    SpO2:  99%   Weight: 100 kg (221 lb)    Height: 6' (1 829 m) Physical Exam   Constitutional: He is oriented to person, place, and time  He appears well-developed and well-nourished  HENT:   Head: Normocephalic and atraumatic  Mouth/Throat: No oropharyngeal exudate  Both tympanic membranes dull with fluid no injection positive allergic turbinates positive pansinus tenderness to percussion positive purulent postnasal drip minimal pharyngeal injection negative exudate   Eyes: Pupils are equal, round, and reactive to light  Conjunctivae and EOM are normal  No scleral icterus  Neck: Neck supple  Cardiovascular: Normal rate, regular rhythm and normal heart sounds  Pulmonary/Chest: Effort normal and breath sounds normal    Lymphadenopathy:     He has cervical adenopathy  Neurological: He is alert and oriented to person, place, and time  No cranial nerve deficit  Skin: Skin is warm and dry  Psychiatric: He has a normal mood and affect  BMI Counseling: Body mass index is 29 97 kg/m²  The BMI is above normal  Nutrition recommendations include 3-5 servings of fruits/vegetables daily  Exercise recommendations include exercising 3-5 times per week

## 2019-09-23 NOTE — PATIENT INSTRUCTIONS
Return in 1 week if still symptoms  Complete pulmonary function testing in office next month    Diet exercise weight loss recommended

## 2019-10-08 ENCOUNTER — PROCEDURE VISIT (OUTPATIENT)
Dept: FAMILY MEDICINE CLINIC | Facility: CLINIC | Age: 29
End: 2019-10-08
Payer: COMMERCIAL

## 2019-10-08 VITALS
SYSTOLIC BLOOD PRESSURE: 128 MMHG | HEIGHT: 72 IN | DIASTOLIC BLOOD PRESSURE: 76 MMHG | WEIGHT: 225 LBS | BODY MASS INDEX: 30.48 KG/M2 | HEART RATE: 80 BPM | RESPIRATION RATE: 16 BRPM

## 2019-10-08 DIAGNOSIS — H69.80 DYSFUNCTION OF EUSTACHIAN TUBE, UNSPECIFIED LATERALITY: ICD-10-CM

## 2019-10-08 DIAGNOSIS — J45.41 MODERATE PERSISTENT ASTHMA WITH ACUTE EXACERBATION: ICD-10-CM

## 2019-10-08 DIAGNOSIS — Z13.220 SCREENING FOR LIPID DISORDERS: ICD-10-CM

## 2019-10-08 DIAGNOSIS — R53.83 FATIGUE, UNSPECIFIED TYPE: ICD-10-CM

## 2019-10-08 DIAGNOSIS — J30.9 ALLERGIC RHINITIS, UNSPECIFIED SEASONALITY, UNSPECIFIED TRIGGER: Primary | ICD-10-CM

## 2019-10-08 DIAGNOSIS — F32.1 CURRENT MODERATE EPISODE OF MAJOR DEPRESSIVE DISORDER WITHOUT PRIOR EPISODE (HCC): ICD-10-CM

## 2019-10-08 PROCEDURE — 99214 OFFICE O/P EST MOD 30 MIN: CPT | Performed by: FAMILY MEDICINE

## 2019-10-08 RX ORDER — ESCITALOPRAM OXALATE 10 MG/1
10 TABLET ORAL DAILY
Qty: 30 TABLET | Refills: 5 | Status: SHIPPED | OUTPATIENT
Start: 2019-10-08 | End: 2019-10-18 | Stop reason: SDUPTHER

## 2019-10-08 NOTE — PATIENT INSTRUCTIONS
Patient return in 4 weeks  Complete blood work as ordered  Follow-up with counselor as ordered    Increase exercise will help BMI as well as the mental symptoms

## 2019-10-08 NOTE — PROGRESS NOTES
Assessment and Plan:  1  Per allergic rhinitis/ eustachian tube dysfunction, stable continue present therapy  2  Asthma, stable influenza vaccine was refused  3  MDD, the pathophysiology of this disease was discussed with the patient  Lexapro was ordered  Risk and benefit especially increased risk of suicidal ideation was discussed  Refer for counseling  Increase exercise will help  4  Fatigue, blood work is ordered  5  Patient to be recheck in 4 weeks, sooner if needed        Problem List Items Addressed This Visit        Respiratory    Moderate persistent asthma      Influenza refused         Relevant Orders    CBC    Comprehensive metabolic panel    Lipid Panel with Direct LDL reflex    TSH, 3rd generation with Free T4 reflex    Urinalysis with reflex to microscopic    Allergic rhinitis - Primary      Stable continue present therapy         Relevant Orders    CBC    Comprehensive metabolic panel    Lipid Panel with Direct LDL reflex    TSH, 3rd generation with Free T4 reflex    Urinalysis with reflex to microscopic       Nervous and Auditory    Eustachian tube dysfunction      Stable continue present therapy         Relevant Orders    CBC    Comprehensive metabolic panel    Lipid Panel with Direct LDL reflex    TSH, 3rd generation with Free T4 reflex    Urinalysis with reflex to microscopic       Other    Current moderate episode of major depressive disorder without prior episode (HCC)      Lexapro started  Patient to increase exercise  Refer for counseling           Relevant Medications    escitalopram (LEXAPRO) 10 mg tablet    Other Relevant Orders    CBC    Comprehensive metabolic panel    Lipid Panel with Direct LDL reflex    TSH, 3rd generation with Free T4 reflex    Urinalysis with reflex to microscopic    Ambulatory referral to Social Work    Fatigue      Blood work ordered         Relevant Orders    CBC    Comprehensive metabolic panel    Lipid Panel with Direct LDL reflex    TSH, 3rd generation with Free T4 reflex    Urinalysis with reflex to microscopic      Other Visit Diagnoses     Screening for lipid disorders        Relevant Orders    CBC    Comprehensive metabolic panel    Lipid Panel with Direct LDL reflex    TSH, 3rd generation with Free T4 reflex    Urinalysis with reflex to microscopic                 Diagnoses and all orders for this visit:    Allergic rhinitis, unspecified seasonality, unspecified trigger  -     CBC; Future  -     Comprehensive metabolic panel; Future  -     Lipid Panel with Direct LDL reflex; Future  -     TSH, 3rd generation with Free T4 reflex; Future  -     Urinalysis with reflex to microscopic; Future    Moderate persistent asthma with acute exacerbation  -     CBC; Future  -     Comprehensive metabolic panel; Future  -     Lipid Panel with Direct LDL reflex; Future  -     TSH, 3rd generation with Free T4 reflex; Future  -     Urinalysis with reflex to microscopic; Future    Dysfunction of Eustachian tube, unspecified laterality  -     CBC; Future  -     Comprehensive metabolic panel; Future  -     Lipid Panel with Direct LDL reflex; Future  -     TSH, 3rd generation with Free T4 reflex; Future  -     Urinalysis with reflex to microscopic; Future    Current moderate episode of major depressive disorder without prior episode (HCC)  -     escitalopram (LEXAPRO) 10 mg tablet; Take 1 tablet (10 mg total) by mouth daily  -     CBC; Future  -     Comprehensive metabolic panel; Future  -     Lipid Panel with Direct LDL reflex; Future  -     TSH, 3rd generation with Free T4 reflex; Future  -     Urinalysis with reflex to microscopic; Future  -     Ambulatory referral to Social Work; Future    Fatigue, unspecified type  -     CBC; Future  -     Comprehensive metabolic panel; Future  -     Lipid Panel with Direct LDL reflex; Future  -     TSH, 3rd generation with Free T4 reflex; Future  -     Urinalysis with reflex to microscopic;  Future    Screening for lipid disorders  -     CBC; Future  -     Comprehensive metabolic panel; Future  -     Lipid Panel with Direct LDL reflex; Future  -     TSH, 3rd generation with Free T4 reflex; Future  -     Urinalysis with reflex to microscopic; Future              Subjective:      Patient ID: Anabella Benjamin is a 26 y o  male  CC:    Chief Complaint   Patient presents with    Anxiety     pt here for anxiety and depression x 1 year  R Cosmo    Depression       HPI:     Patient feels down and depressed does not get excited about doing things anymore  Has trouble getting out of bed in the morning just to go to work  Patient denies any suicidal homicidal ideations  Patient has not had  This symptom before  The following portions of the patient's history were reviewed and updated as appropriate: allergies, current medications, past family history, past medical history, past social history, past surgical history and problem list       Review of Systems   Constitutional: Positive for fatigue  HENT: Negative  Eyes: Negative  Respiratory: Negative  Cardiovascular: Negative  Gastrointestinal: Negative  Endocrine: Negative  Genitourinary: Negative  Musculoskeletal: Negative  Skin: Negative  Allergic/Immunologic: Positive for environmental allergies  Neurological: Negative  Hematological: Negative  Psychiatric/Behavioral:          HPI         Data to review:       Objective:    Vitals:    10/08/19 1428   BP: 128/76   BP Location: Left arm   Patient Position: Sitting   Cuff Size: Large   Pulse: 80   Resp: 16   Weight: 102 kg (225 lb)   Height: 6' (1 829 m)        Physical Exam   Constitutional: He is oriented to person, place, and time  He appears well-developed and well-nourished  HENT:   Head: Normocephalic  Right Ear: External ear normal    Left Ear: External ear normal    Mouth/Throat: Oropharynx is clear and moist  No oropharyngeal exudate      Mildly allergic turbinates   Eyes: Pupils are equal, round, and reactive to light  Conjunctivae and EOM are normal  No scleral icterus  Neck: Neck supple  No JVD present  Cardiovascular: Normal rate, regular rhythm and normal heart sounds  Pulmonary/Chest: Effort normal and breath sounds normal    Abdominal: Soft  Bowel sounds are normal  There is no tenderness  Musculoskeletal: He exhibits no edema  Neurological: He is alert and oriented to person, place, and time  No cranial nerve deficit  Skin: Skin is warm and dry  Psychiatric: His behavior is normal  Judgment and thought content normal     Mildly depressed         BMI Counseling: Body mass index is 30 52 kg/m²  The BMI is above normal  Exercise recommendations include exercising 3-5 times per week

## 2019-10-18 DIAGNOSIS — J30.9 ALLERGIC RHINITIS, UNSPECIFIED SEASONALITY, UNSPECIFIED TRIGGER: Primary | ICD-10-CM

## 2019-10-18 DIAGNOSIS — F32.1 CURRENT MODERATE EPISODE OF MAJOR DEPRESSIVE DISORDER WITHOUT PRIOR EPISODE (HCC): ICD-10-CM

## 2019-10-18 DIAGNOSIS — J45.41 MODERATE PERSISTENT ASTHMA WITH ACUTE EXACERBATION: ICD-10-CM

## 2019-10-18 RX ORDER — MONTELUKAST SODIUM 10 MG/1
10 TABLET ORAL DAILY
Qty: 90 TABLET | Refills: 0 | Status: SHIPPED | OUTPATIENT
Start: 2019-10-18

## 2019-10-18 RX ORDER — ESCITALOPRAM OXALATE 10 MG/1
10 TABLET ORAL DAILY
Qty: 90 TABLET | Refills: 0 | Status: SHIPPED | OUTPATIENT
Start: 2019-10-18 | End: 2020-05-04

## 2020-01-06 ENCOUNTER — TELEPHONE (OUTPATIENT)
Dept: FAMILY MEDICINE CLINIC | Facility: CLINIC | Age: 30
End: 2020-01-06

## 2020-01-06 NOTE — TELEPHONE ENCOUNTER
PATIENT NEEDS NOTE FROM OUR OFFICE STATING HE IS HEALTHY TO WORK  LAST SEEN 10/08/2019  ADVISED PATIENT SHOULD PROBABLY MAKE APPOINTMENT FOR THEM TO ASSESS IF HE IS HEALTHY SINCE HE HAS NOT BEEN SEEN IN THREE MONTHS  WIFE STATES THEY HAVE A DEDUCTIBLE TO MEET FOR THE YEAR AND DOES NOT REALLY TO HAVE TO PAY FOR A VISIT, WILL WE DO LETTER WITHOUT PATIENT BEING SEEN?  [phone]

## 2020-01-07 NOTE — TELEPHONE ENCOUNTER
Dhaval,  Just an FYI  Appt set with you on Friday  Dr Ozzy Dozier said he needed an OV but is now on vacation  Wife made appt because he needs this note  But she is very upset because this note is only to apply for a job, not for the job itself  He just saw the doctor 3 months ago for his annual check up

## 2020-01-08 NOTE — TELEPHONE ENCOUNTER
Unfortunately I have not seen the patient since January of 2017 so I could not say, I would have to go with what Dr Brunilda Marcus recommends

## 2020-01-10 ENCOUNTER — OFFICE VISIT (OUTPATIENT)
Dept: FAMILY MEDICINE CLINIC | Facility: CLINIC | Age: 30
End: 2020-01-10
Payer: COMMERCIAL

## 2020-01-10 VITALS
SYSTOLIC BLOOD PRESSURE: 122 MMHG | BODY MASS INDEX: 29.82 KG/M2 | RESPIRATION RATE: 14 BRPM | HEIGHT: 72 IN | WEIGHT: 220.2 LBS | TEMPERATURE: 97 F | HEART RATE: 64 BPM | DIASTOLIC BLOOD PRESSURE: 70 MMHG

## 2020-01-10 DIAGNOSIS — Z00.00 HEALTHCARE MAINTENANCE: Primary | ICD-10-CM

## 2020-01-10 PROCEDURE — 99395 PREV VISIT EST AGE 18-39: CPT | Performed by: PHYSICIAN ASSISTANT

## 2020-01-10 PROCEDURE — 3008F BODY MASS INDEX DOCD: CPT | Performed by: PHYSICIAN ASSISTANT

## 2020-01-10 NOTE — PROGRESS NOTES
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  He does not have any history of recurrent musculoskeletal problems  2  Moderate persistent asthma - stable on Symbicort  Lungs are clear  Continue long-term therapy  Diagnoses and all orders for this visit:    Healthcare maintenance              Subjective:      Patient ID: Michael Ross is a 31 y o  male  CC:    Chief Complaint   Patient presents with    Annual Exam     Pt states he needs physical for new job, he does not have a form  HPI:      HPI:  This is a 26-year-old gentleman that presents to the office for health maintenance physical   He is planning on taking a new job, 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 and he wanted to get a Physical to make sure he is competent  He has not had any problems with recurrent back pain or musculoskeletal problems  He does have a history of moderate persistent asthma but this has been well controlled with Symbicort twice daily  He has not needed his rescue inhaler frequently  The following portions of the patient's history were reviewed and updated as appropriate: allergies, current medications, past family history, past medical history, past social history, past surgical history and problem list       Review of Systems   Constitutional: Negative for chills, fatigue and fever  HENT: Negative for congestion, ear pain and sinus pressure  Eyes: Negative for visual disturbance  Respiratory: Negative for cough, chest tightness and shortness of breath  Cardiovascular: Negative for chest pain and palpitations  Gastrointestinal: Negative for diarrhea, nausea and vomiting  Endocrine: Negative for polyuria  Genitourinary: Negative for dysuria and frequency  Musculoskeletal: Negative for arthralgias and myalgias  Skin: Negative for pallor and rash  Neurological: Negative for dizziness, weakness, light-headedness, numbness and headaches  Psychiatric/Behavioral: Negative for agitation, behavioral problems and sleep disturbance  All other systems reviewed and are negative  Data to review:       Objective:    Vitals:    01/10/20 0832   BP: 122/70   BP Location: Left arm   Patient Position: Sitting   Cuff Size: Adult   Pulse: 64   Resp: 14   Temp: (!) 97 °F (36 1 °C)   TempSrc: Tympanic   Weight: 99 9 kg (220 lb 3 2 oz)   Height: 6' (1 829 m)        Physical Exam   Constitutional: He is oriented to person, place, and time  He appears well-developed and well-nourished  No distress  HENT:   Head: Normocephalic and atraumatic  Right Ear: External ear normal    Left Ear: External ear normal    Nose: Nose normal    Mouth/Throat: Oropharynx is clear and moist  No oropharyngeal exudate  Eyes: Pupils are equal, round, and reactive to light  Conjunctivae and EOM are normal    Neck: Normal range of motion  Neck supple  No tracheal deviation present  No thyromegaly present  Cardiovascular: Normal rate, regular rhythm and normal heart sounds  Exam reveals no friction rub  No murmur heard  Pulmonary/Chest: Effort normal and breath sounds normal  No respiratory distress  He has no wheezes  He has no rales  Abdominal: Soft  Bowel sounds are normal  He exhibits no distension  There is no tenderness  There is no rebound and no guarding  Musculoskeletal: Normal range of motion  He exhibits no edema or tenderness  Lymphadenopathy:     He has no cervical adenopathy  Neurological: He is alert and oriented to person, place, and time  No cranial nerve deficit  Coordination normal    Skin: Skin is warm and dry  No rash noted  No erythema  Psychiatric: He has a normal mood and affect  His behavior is normal  Thought content normal    Nursing note and vitals reviewed  BMI Counseling:  Body mass index is 29 86 kg/m²  The BMI is above normal  Nutrition recommendations include decreasing portion sizes  Exercise recommendations include exercising 3-5 times per week

## 2020-01-10 NOTE — PATIENT INSTRUCTIONS
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

## 2020-01-10 NOTE — LETTER
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

## 2020-02-08 DIAGNOSIS — J45.909 ASTHMA, UNSPECIFIED ASTHMA SEVERITY, UNSPECIFIED WHETHER COMPLICATED, UNSPECIFIED WHETHER PERSISTENT: ICD-10-CM

## 2020-02-10 RX ORDER — BUDESONIDE AND FORMOTEROL FUMARATE DIHYDRATE 160; 4.5 UG/1; UG/1
2 AEROSOL RESPIRATORY (INHALATION) 2 TIMES DAILY
Qty: 10.2 G | Refills: 11 | Status: SHIPPED | OUTPATIENT
Start: 2020-02-10 | End: 2020-11-24

## 2020-02-10 RX ORDER — BUDESONIDE AND FORMOTEROL FUMARATE DIHYDRATE 160; 4.5 UG/1; UG/1
AEROSOL RESPIRATORY (INHALATION)
Qty: 10.2 G | Refills: 11 | Status: SHIPPED | OUTPATIENT
Start: 2020-02-10 | End: 2020-02-10 | Stop reason: SDUPTHER

## 2020-02-10 NOTE — TELEPHONE ENCOUNTER
Dhaval, Patient needs the Symbicort script sent to [facility] in [place]  It wont be paid for if it goes to [facility]   Thanks  Pauline

## 2020-05-04 DIAGNOSIS — F32.1 CURRENT MODERATE EPISODE OF MAJOR DEPRESSIVE DISORDER WITHOUT PRIOR EPISODE (HCC): ICD-10-CM

## 2020-05-04 RX ORDER — ESCITALOPRAM OXALATE 10 MG/1
TABLET ORAL
Qty: 90 TABLET | Refills: 1 | Status: SHIPPED | OUTPATIENT
Start: 2020-05-04 | End: 2020-10-26

## 2020-10-12 ENCOUNTER — OFFICE VISIT (OUTPATIENT)
Dept: FAMILY MEDICINE CLINIC | Facility: CLINIC | Age: 30
End: 2020-10-12
Payer: COMMERCIAL

## 2020-10-12 VITALS
BODY MASS INDEX: 34 KG/M2 | WEIGHT: 251 LBS | SYSTOLIC BLOOD PRESSURE: 120 MMHG | TEMPERATURE: 98.2 F | DIASTOLIC BLOOD PRESSURE: 78 MMHG | HEIGHT: 72 IN

## 2020-10-12 DIAGNOSIS — B35.6 TINEA CRURIS: Primary | ICD-10-CM

## 2020-10-12 DIAGNOSIS — E66.9 OBESITY (BMI 30-39.9): ICD-10-CM

## 2020-10-12 DIAGNOSIS — J45.40 MODERATE PERSISTENT ASTHMA WITHOUT COMPLICATION: ICD-10-CM

## 2020-10-12 DIAGNOSIS — F32.1 CURRENT MODERATE EPISODE OF MAJOR DEPRESSIVE DISORDER WITHOUT PRIOR EPISODE (HCC): ICD-10-CM

## 2020-10-12 DIAGNOSIS — Z00.00 HEALTH CARE MAINTENANCE: ICD-10-CM

## 2020-10-12 PROCEDURE — 1036F TOBACCO NON-USER: CPT | Performed by: FAMILY MEDICINE

## 2020-10-12 PROCEDURE — 99214 OFFICE O/P EST MOD 30 MIN: CPT | Performed by: FAMILY MEDICINE

## 2020-10-12 RX ORDER — KETOCONAZOLE 20 MG/G
CREAM TOPICAL DAILY
Qty: 30 G | Refills: 3 | Status: SHIPPED | OUTPATIENT
Start: 2020-10-12 | End: 2021-11-22

## 2020-10-23 DIAGNOSIS — F32.1 CURRENT MODERATE EPISODE OF MAJOR DEPRESSIVE DISORDER WITHOUT PRIOR EPISODE (HCC): ICD-10-CM

## 2020-10-26 RX ORDER — ESCITALOPRAM OXALATE 10 MG/1
TABLET ORAL
Qty: 90 TABLET | Refills: 3 | Status: SHIPPED | OUTPATIENT
Start: 2020-10-26 | End: 2021-11-22 | Stop reason: SDUPTHER

## 2020-11-14 ENCOUNTER — TELEMEDICINE (OUTPATIENT)
Dept: FAMILY MEDICINE CLINIC | Facility: CLINIC | Age: 30
End: 2020-11-14
Payer: COMMERCIAL

## 2020-11-14 DIAGNOSIS — Z20.822 EXPOSURE TO COVID-19 VIRUS: Primary | ICD-10-CM

## 2020-11-14 DIAGNOSIS — Z20.822 EXPOSURE TO COVID-19 VIRUS: ICD-10-CM

## 2020-11-14 PROCEDURE — 99212 OFFICE O/P EST SF 10 MIN: CPT | Performed by: FAMILY MEDICINE

## 2020-11-14 PROCEDURE — U0003 INFECTIOUS AGENT DETECTION BY NUCLEIC ACID (DNA OR RNA); SEVERE ACUTE RESPIRATORY SYNDROME CORONAVIRUS 2 (SARS-COV-2) (CORONAVIRUS DISEASE [COVID-19]), AMPLIFIED PROBE TECHNIQUE, MAKING USE OF HIGH THROUGHPUT TECHNOLOGIES AS DESCRIBED BY CMS-2020-01-R: HCPCS | Performed by: FAMILY MEDICINE

## 2020-11-15 LAB — SARS-COV-2 RNA SPEC QL NAA+PROBE: NOT DETECTED

## 2020-11-20 DIAGNOSIS — J45.909 ASTHMA, UNSPECIFIED ASTHMA SEVERITY, UNSPECIFIED WHETHER COMPLICATED, UNSPECIFIED WHETHER PERSISTENT: ICD-10-CM

## 2020-11-23 ENCOUNTER — TELEPHONE (OUTPATIENT)
Dept: FAMILY MEDICINE CLINIC | Facility: CLINIC | Age: 30
End: 2020-11-23

## 2020-11-23 RX ORDER — BUDESONIDE AND FORMOTEROL FUMARATE DIHYDRATE 160; 4.5 UG/1; UG/1
2 AEROSOL RESPIRATORY (INHALATION) 2 TIMES DAILY
Qty: 10.2 G | Refills: 0 | OUTPATIENT
Start: 2020-11-23

## 2020-12-22 ENCOUNTER — TELEPHONE (OUTPATIENT)
Dept: FAMILY MEDICINE CLINIC | Facility: CLINIC | Age: 30
End: 2020-12-22

## 2020-12-22 DIAGNOSIS — J45.909 ASTHMA, UNSPECIFIED ASTHMA SEVERITY, UNSPECIFIED WHETHER COMPLICATED, UNSPECIFIED WHETHER PERSISTENT: ICD-10-CM

## 2021-02-20 DIAGNOSIS — J45.909 ASTHMA, UNSPECIFIED ASTHMA SEVERITY, UNSPECIFIED WHETHER COMPLICATED, UNSPECIFIED WHETHER PERSISTENT: ICD-10-CM

## 2021-11-22 ENCOUNTER — OFFICE VISIT (OUTPATIENT)
Dept: FAMILY MEDICINE CLINIC | Facility: CLINIC | Age: 31
End: 2021-11-22
Payer: COMMERCIAL

## 2021-11-22 VITALS
HEIGHT: 72 IN | BODY MASS INDEX: 34.27 KG/M2 | DIASTOLIC BLOOD PRESSURE: 80 MMHG | WEIGHT: 253 LBS | SYSTOLIC BLOOD PRESSURE: 136 MMHG | RESPIRATION RATE: 16 BRPM | HEART RATE: 80 BPM

## 2021-11-22 DIAGNOSIS — R53.83 FATIGUE, UNSPECIFIED TYPE: ICD-10-CM

## 2021-11-22 DIAGNOSIS — H69.80 DYSFUNCTION OF EUSTACHIAN TUBE, UNSPECIFIED LATERALITY: ICD-10-CM

## 2021-11-22 DIAGNOSIS — Z13.220 SCREENING FOR LIPID DISORDERS: ICD-10-CM

## 2021-11-22 DIAGNOSIS — E66.9 OBESITY (BMI 30-39.9): ICD-10-CM

## 2021-11-22 DIAGNOSIS — Z00.00 HEALTH CARE MAINTENANCE: ICD-10-CM

## 2021-11-22 DIAGNOSIS — J45.40 MODERATE PERSISTENT ASTHMA WITHOUT COMPLICATION: ICD-10-CM

## 2021-11-22 DIAGNOSIS — J30.9 ALLERGIC RHINITIS, UNSPECIFIED SEASONALITY, UNSPECIFIED TRIGGER: Primary | ICD-10-CM

## 2021-11-22 DIAGNOSIS — F32.1 CURRENT MODERATE EPISODE OF MAJOR DEPRESSIVE DISORDER WITHOUT PRIOR EPISODE (HCC): ICD-10-CM

## 2021-11-22 DIAGNOSIS — Z11.4 SCREENING FOR HIV (HUMAN IMMUNODEFICIENCY VIRUS): ICD-10-CM

## 2021-11-22 DIAGNOSIS — Z11.59 NEED FOR HEPATITIS C SCREENING TEST: ICD-10-CM

## 2021-11-22 PROCEDURE — 3725F SCREEN DEPRESSION PERFORMED: CPT | Performed by: FAMILY MEDICINE

## 2021-11-22 PROCEDURE — 3008F BODY MASS INDEX DOCD: CPT | Performed by: FAMILY MEDICINE

## 2021-11-22 PROCEDURE — 1036F TOBACCO NON-USER: CPT | Performed by: FAMILY MEDICINE

## 2021-11-22 PROCEDURE — 99214 OFFICE O/P EST MOD 30 MIN: CPT | Performed by: FAMILY MEDICINE

## 2021-11-22 RX ORDER — ESCITALOPRAM OXALATE 10 MG/1
10 TABLET ORAL DAILY
Qty: 90 TABLET | Refills: 3 | Status: SHIPPED | OUTPATIENT
Start: 2021-11-22

## 2022-02-21 ENCOUNTER — TELEMEDICINE (OUTPATIENT)
Dept: FAMILY MEDICINE CLINIC | Facility: CLINIC | Age: 32
End: 2022-02-21
Payer: COMMERCIAL

## 2022-02-21 VITALS — HEIGHT: 72 IN | BODY MASS INDEX: 33.86 KG/M2 | WEIGHT: 250 LBS | TEMPERATURE: 98.7 F

## 2022-02-21 DIAGNOSIS — J45.40 MODERATE PERSISTENT ASTHMA WITHOUT COMPLICATION: ICD-10-CM

## 2022-02-21 DIAGNOSIS — J30.9 ALLERGIC RHINITIS, UNSPECIFIED SEASONALITY, UNSPECIFIED TRIGGER: ICD-10-CM

## 2022-02-21 DIAGNOSIS — J01.40 ACUTE NON-RECURRENT PANSINUSITIS: ICD-10-CM

## 2022-02-21 DIAGNOSIS — Z20.822 ENCOUNTER BY TELEHEALTH FOR SUSPECTED COVID-19: Primary | ICD-10-CM

## 2022-02-21 PROCEDURE — 99214 OFFICE O/P EST MOD 30 MIN: CPT | Performed by: NURSE PRACTITIONER

## 2022-02-21 PROCEDURE — 1036F TOBACCO NON-USER: CPT | Performed by: NURSE PRACTITIONER

## 2022-02-21 PROCEDURE — 3008F BODY MASS INDEX DOCD: CPT | Performed by: NURSE PRACTITIONER

## 2022-02-21 RX ORDER — PREDNISONE 10 MG/1
TABLET ORAL
Qty: 30 TABLET | Refills: 0 | Status: SHIPPED | OUTPATIENT
Start: 2022-02-21 | End: 2022-06-16

## 2022-02-21 RX ORDER — AZITHROMYCIN 250 MG/1
TABLET, FILM COATED ORAL
Qty: 6 TABLET | Refills: 0 | Status: SHIPPED | OUTPATIENT
Start: 2022-02-21 | End: 2022-02-26

## 2022-02-22 PROCEDURE — U0005 INFEC AGEN DETEC AMPLI PROBE: HCPCS | Performed by: NURSE PRACTITIONER

## 2022-02-22 PROCEDURE — U0003 INFECTIOUS AGENT DETECTION BY NUCLEIC ACID (DNA OR RNA); SEVERE ACUTE RESPIRATORY SYNDROME CORONAVIRUS 2 (SARS-COV-2) (CORONAVIRUS DISEASE [COVID-19]), AMPLIFIED PROBE TECHNIQUE, MAKING USE OF HIGH THROUGHPUT TECHNOLOGIES AS DESCRIBED BY CMS-2020-01-R: HCPCS | Performed by: NURSE PRACTITIONER

## 2022-02-22 NOTE — PROGRESS NOTES
COVID-19 Outpatient Progress Note    Assessment/Plan:    Problem List Items Addressed This Visit        Respiratory    Moderate persistent asthma     Well controlled with b i d  Advair use  Patient was advised that albuterol inhaler can be used as needed for shortness of breath not controlled by Advair  Prednisone taper also ordered to help with any airway inflammation  Relevant Medications    predniSONE 10 mg tablet    Allergic rhinitis     Patient advised to continue Astelin, Zyrtec, and Singulair  Relevant Medications    predniSONE 10 mg tablet    Acute non-recurrent pansinusitis     Azithromycin and prednisone taper ordered to treat sinusitis  Relevant Medications    azithromycin (Zithromax) 250 mg tablet    predniSONE 10 mg tablet       Other    Encounter by telehealth for suspected COVID-19 - Primary     COVID testing ordered  I will follow-up with patient pending results of COVID test          Relevant Orders    COVID [facility] - [facility] at   [facility]         Disposition:     Referred patient to centralized site to test for COVID-19  I have spent 15 minutes directly with the patient  Encounter provider KELIN Doty    Provider located at [address] [address] [phone]    Recent Visits  No visits were found meeting these conditions  Showing recent visits within past 7 days and meeting all other requirements  Today's Visits  Date Type Provider Dept   02/21/22 Telemedicine Avtar Cassidy [facility] Primary Care   Showing today's visits and meeting all other requirements  Future Appointments  No visits were found meeting these conditions  Showing future appointments within next 150 days and meeting all other requirements     This virtual check-in was done via [employer] and patient was informed that this is a secure, HIPAA-compliant platform  He agrees to proceed      Patient agrees to participate in a virtual check in via telephone or video visit instead of presenting to the office to address urgent/immediate medical needs  Patient is aware this is a billable service  After connecting through [facility], the patient was identified by name and date of birth  Narcisa Mancilla was informed that this was a telemedicine visit and that the exam was being conducted confidentially over secure lines  My office door was closed  No one else was in the room  Narcisa Mancilla acknowledged consent and understanding of privacy and security of the telemedicine visit  I informed the patient that I have reviewed his record in Epic and presented the opportunity for him to ask any questions regarding the visit today  The patient agreed to participate  Verification of patient location:  Patient is located in the following state in which I hold an active license: [place]    Subjective: Narcisa Mancilla is a 34 y o  male who is concerned about COVID-19  Patient's symptoms include fatigue, malaise, nasal congestion, rhinorrhea, sore throat, cough (productive ), shortness of breath (with coughing fits ), chest tightness and headache  Patient denies fever, chills, anosmia, loss of taste, abdominal pain, nausea, vomiting, diarrhea and myalgias       - Date of symptom onset: 2/17/2022      COVID-19 vaccination status: Not vaccinated    Exposure:   Contact with a person who is under investigation (PUI) for or who is positive for COVID-19 within the last 14 days?: No    Hospitalized recently for fever and/or lower respiratory symptoms?: No      Currently a healthcare worker that is involved in direct patient care?: No      Works in a special setting where the risk of COVID-19 transmission may be high? (this may include long-term care, correctional and FDC facilities; homeless shelters; assisted-living facilities and group homes ): No      Resident in a special setting where the risk of COVID-19 transmission may be high? (this may include long-term care, correctional and senior care facilities; homeless shelters; assisted-living facilities and group homes ): No      Patient is reporting symptoms of sinus pressure and congestion, pruritus of his bilateral ears, intermittent productive cough, rhinorrhea, nasal congestion, sore throat, headaches, and increased fatigue  Patient is not vaccinated against COVID and denies any known COVID exposures in the past 2 weeks  COVID testing ordered  Patient advised to continue to quarantine until he receives the results of his COVID test     Asthma:  Patient reports that he has been having some episodes of shortness of breath when he has a coughing fit however, he reports that his symptoms have been controlled with b i d  Advair use  Patient denies needing to use his albuterol inhaler since his symptoms have began  Lab Results   Component Value Date    SARSCOV2 Not Detected 11/14/2020     No past medical history on file  No past surgical history on file  Current Outpatient Medications   Medication Sig Dispense Refill    Albuterol Sulfate 108 (90 Base) MCG/ACT AEPB Inhale 2 sprays every 4 (four) hours as needed (wheeze) 3 each 0    azelastine (ASTELIN) 0 1 % nasal spray 2 sprays into each nostril 2 (two) times a day      cetirizine (ZYRTEC ALLERGY) 10 mg tablet Take 1 tablet by mouth daily      escitalopram (LEXAPRO) 10 mg tablet Take 1 tablet (10 mg total) by mouth daily 90 tablet 3    fluticasone-salmeterol (Advair Diskus) 500-50 mcg/dose inhaler Inhale 1 puff 2 (two) times a day Rinse mouth after use  180 blister 3    montelukast (SINGULAIR) 10 mg tablet Take 1 tablet (10 mg total) by mouth daily 90 tablet 0    azithromycin (Zithromax) 250 mg tablet Take 2 tablets (500 mg total) by mouth daily for 1 day, THEN 1 tablet (250 mg total) daily for 4 days  6 tablet 0    predniSONE 10 mg tablet Use 5 tablets for 2 days  Use 4 tablets for 2 days  Use 3 tablets for 2 days   Use 2 tablets for 2 days  Use 1 tablet for 2 days  30 tablet 0     No current facility-administered medications for this visit  No Known Allergies    Review of Systems   Constitutional: Positive for fatigue  Negative for chills and fever  HENT: Positive for congestion, rhinorrhea and sore throat  Eyes: Negative  Respiratory: Positive for cough (productive ), chest tightness and shortness of breath (with coughing fits )  Cardiovascular: Negative  Gastrointestinal: Negative for abdominal pain, diarrhea, nausea and vomiting  Endocrine: Negative  Genitourinary: Negative  Musculoskeletal: Negative for myalgias  Skin: Negative  Allergic/Immunologic: Positive for environmental allergies  Neurological: Positive for headaches  Hematological: Negative  Psychiatric/Behavioral: Negative  Objective:    Vitals:    02/21/22 1759   Temp: 98 7 °F (37 1 °C)   Weight: 113 kg (250 lb)   Height: 6' (1 829 m)       Physical Exam  Vitals reviewed: limited due to [employer] exam    Constitutional:       General: He is not in acute distress  Appearance: Normal appearance  He is not ill-appearing  Neurological:      Mental Status: He is alert  VIRTUAL VISIT DISCLAIMER    Saadia Montero verbally agrees to participate in [employer]  Pt is aware that [employer] could be limited without vital signs or the ability to perform a full hands-on physical exam  Sim Tomas understands he or the provider may request at any time to terminate the video visit and request the patient to seek care or treatment in person

## 2022-02-22 NOTE — ASSESSMENT & PLAN NOTE
Well controlled with b i d  Advair use  Patient was advised that albuterol inhaler can be used as needed for shortness of breath not controlled by Advair  Prednisone taper also ordered to help with any airway inflammation

## 2022-02-22 NOTE — PATIENT INSTRUCTIONS
COVID TESTING SITES    [facility] is offering drive through testing for COVID  These are the preferred testing sites  Wait times should be minimal     Beginning 1/24/2022:    [address]: M-F 3-7; LAST DAY: [id number]    The sites and times are subject to change  For the most Up to date locations and times, please visit this website:    [email or web address]      If you are not able to get to one of the drive through sites, you can go to [facility] if you have an order  There can be a long wait time for this service  When arriving at [facility]:  Call the number [facility], and they will instruct you as to what to do  Below is the link to [facility] locations    Walk-In Locations - Skip The Wait  [facility]  [facility] [address] [phone])   Lino at      [facility]  [address] [facility], [address]  [facility], [address] [facility]  7:30 am to 10:30 pm  Sat  - Sun  8 am to 8 pm    [address], [address]  [phone]  Mon  - Fri  7:30 am to 10:30 pm  Sat  - Sun  8 am to 8 pm    [address] [facility]  [address] [facility], [address]  [facility], [address] [facility] [address]90  [address]  8 am to 8 pm  Sat  - Sun  8 am to 4 pm    [address]  [address] , [address] [facility]  [phone]  Mon  - Fri  8 am to 4 pm  Sat  - Sun  8 am to 4 pm    [address] [facility], [address] [facility]note 2]  [phone]  Mon  - Fri  8 am to 8 pm  Sat  - Sun  8 am to 4 pm    [address] [place] [address] [place]  [address] [facility], [address]  [address] [facility] [address]  8 am to 8 pm  Sat  - Sun  8 am to 4 pm    [address] [facility], [address] [place]  [phone]  Mon  - Fri  8 am to 4 pm    [address]  [address] [facility], [address]  [phone]  Mon  - Fri  8 am to 8 pm  Sat  - Sun  8 am to 8 pm    [address]  [address]  [facility], [address]  [phone]  Mon  - Fri  8 am to 8 pm    [facility]  [address] [place] [address], [address] [facility]  Mon -Fri  8 am to 8 pm  Sat - Sun  8 am to 4 pm    [address] [facility]  [address] [facility], [address]  [address] Fri  8 am to 8 pm  Sat  - Sun  8 am to 4 pm    [address]  [address], [address]  [phone]  Mon  - Fri  8 am to 4 pm    [address] [facility]  [address]  [facility], [address] [facility] [address]  [phone]  Mon  - Fri  8 am to 8 pm  Sat  - Sun  8 am to 8 pm    [address] [place] [address] Now - [place]  [address]  [place], [address]  [phone]  Mon  - Fri  8 am to 8 pm  Sat  - Sun  8 am to 8 pm    [address]   [address], [address], [address]  [phone]  Mon  - Fri  7:30 am to 10:30 pm  Sat  - Sun  8 am to 8 pm    [address]  [address] [facility] [address] [place], [place], [address]  Mon -Fri  8 am to 6 pm    [address] [facility]  [address], [address]  [phone]  Mon  - Fri  8 am to 8 pm    [address]  [address] [facility], [address]  [phone]  Mon  - Fri  8 am to 8 pm  Sat  - Sun  8 am to 4 pm    [address]    Your healthcare provider and/or public health staff have evaluated you and have determined that you do not need to remain in the hospital at this time  At this time you can be isolated at home where you will be monitored by staff from your local or state health department  You should carefully follow the prevention and isolation steps below until a healthcare provider or local or state health department says that you can return to your normal activities  Stay home except to get medical care    People who are mildly ill with COVID-19 are able to isolate at home during their illness  You should restrict activities outside your home, except for getting medical care  Do not go to work, school, or public areas  Avoid using public transportation, ride-sharing, or taxis  Separate yourself from other people and animals in your home    People: As much as possible, you should stay in a specific room and away from other people in your home  Also, you should use a separate bathroom, if available  Animals: You should restrict contact with pets and other animals while you are sick with COVID-19, just like you would around other people  Although there have not been reports of pets or other animals becoming sick with COVID-19, it is still recommended that people sick with COVID-19 limit contact with animals until more information is known about the virus  When possible, have another member of your household care for your animals while you are sick  If you are sick with COVID-19, avoid contact with your pet, including petting, snuggling, being kissed or licked, and sharing food  If you must care for your pet or be around animals while you are sick, wash your hands before and after you interact with pets and wear a facemask  See COVID-19 and Animals for more information      Call ahead before visiting your doctor    If you have a medical appointment, call the healthcare provider and tell them that you have or may have COVID-19  This will help the healthcare providers office take steps to keep other people from getting infected or exposed  Wear a facemask    You should wear a facemask when you are around other people (e g , sharing a room or vehicle) or pets and before you enter a healthcare providers office  If you are not able to wear a facemask (for example, because it causes trouble breathing), then people who live with you should not stay in the same room with you, or they should wear a facemask if they enter your room  Cover your coughs and sneezes    Cover your mouth and nose with a tissue when you cough or sneeze  Throw used tissues in a lined trash can  Immediately wash your hands with soap and water for at least 20 seconds or, if soap and water are not available, clean your hands with an alcohol-based hand  that contains at least 60% alcohol  Clean your hands often    Wash your hands often with soap and water for at least 20 seconds, especially after blowing your nose, coughing, or sneezing; going to the bathroom; and before eating or preparing food  If soap and water are not readily available, use an alcohol-based hand  with at least 60% alcohol, covering all surfaces of your hands and rubbing them together until they feel dry  Soap and water are the best option if hands are visibly dirty  Avoid touching your eyes, nose, and mouth with unwashed hands  Avoid sharing personal household items    You should not share dishes, drinking glasses, cups, eating utensils, towels, or bedding with other people or pets in your home  After using these items, they should be washed thoroughly with soap and water      Clean all high-touch surfaces everyday    High touch surfaces include counters, tabletops, doorknobs, bathroom fixtures, toilets, phones, keyboards, tablets, and bedside tables  Also, clean any surfaces that may have blood, stool, or body fluids on them  Use a household cleaning spray or wipe, according to the label instructions  Labels contain instructions for safe and effective use of the cleaning product including precautions you should take when applying the product, such as wearing gloves and making sure you have good ventilation during use of the product  Monitor your symptoms    Seek prompt medical attention if your illness is worsening (e g , difficulty breathing)  Before seeking care, call your healthcare provider and tell them that you have, or are being evaluated for, COVID-19  Put on a facemask before you enter the facility  These steps will help the healthcare providers office to keep other people in the office or waiting room from getting infected or exposed  Ask your healthcare provider to call the local or [facility] health department  Persons who are placed under active monitoring or facilitated self-monitoring should follow instructions provided by their local health department or occupational health professionals, as appropriate  If you have a medical emergency and need to call 911, notify the dispatch personnel that you have, or are being evaluated for COVID-19  If possible, put on a facemask before emergency medical services arrive      Discontinuing home isolation    Patients with confirmed COVID-19 should remain under home isolation precautions until the following conditions are met:   - They have had no fever for at least 24 hours (that is one full day of no fever without the use medicine that reduces fevers)  AND  - other symptoms have improved (for example, when their cough or shortness of breath have improved)  AND  - If had mild or moderate illness, at least 10 days have passed since their symptoms first appeared or if severe illness (needed oxygen) or immunosuppressed, at least 20 days have passed since symptoms first appeared  Patients with confirmed COVID-19 should also notify close contacts (including their workplace) and ask that they self-quarantine  Currently, close contact is defined as being within 6 feet for 15 minutes or more from the period 24 hours starting 48 hours before symptom onset to the time at which the patient went into isolation  Close contacts of patients diagnosed with COVID-19 should be instructed by the patient to self-quarantine for 14 days from the last time of their last contact with the patient  Source: [employer]     Problem List Items Addressed This Visit        Respiratory    Moderate persistent asthma     Well controlled with b i d  Advair use  Patient was advised that albuterol inhaler can be used as needed for shortness of breath not controlled by Advair  Prednisone taper also ordered to help with any airway inflammation  Relevant Medications    predniSONE 10 mg tablet    Allergic rhinitis     Patient advised to continue Astelin, Zyrtec, and Singulair  Relevant Medications    predniSONE 10 mg tablet    Acute non-recurrent pansinusitis     Azithromycin and prednisone taper ordered to treat sinusitis  Relevant Medications    azithromycin (Zithromax) 250 mg tablet    predniSONE 10 mg tablet       Other    Encounter by telehealth for suspected COVID-19 - Primary     COVID testing ordered    I will follow-up with patient pending results of COVID test          Relevant Orders    COVID Only - Collected at [facility] or [facility]

## 2022-06-14 ENCOUNTER — APPOINTMENT (OUTPATIENT)
Dept: LAB | Facility: MEDICAL CENTER | Age: 32
End: 2022-06-14
Payer: COMMERCIAL

## 2022-06-14 DIAGNOSIS — E66.9 OBESITY (BMI 30-39.9): ICD-10-CM

## 2022-06-14 DIAGNOSIS — Z11.59 NEED FOR HEPATITIS C SCREENING TEST: ICD-10-CM

## 2022-06-14 DIAGNOSIS — Z11.4 SCREENING FOR HIV (HUMAN IMMUNODEFICIENCY VIRUS): ICD-10-CM

## 2022-06-14 DIAGNOSIS — Z00.00 HEALTH CARE MAINTENANCE: ICD-10-CM

## 2022-06-14 DIAGNOSIS — J30.9 ALLERGIC RHINITIS, UNSPECIFIED SEASONALITY, UNSPECIFIED TRIGGER: ICD-10-CM

## 2022-06-14 DIAGNOSIS — Z13.220 SCREENING FOR LIPID DISORDERS: ICD-10-CM

## 2022-06-14 DIAGNOSIS — F32.1 CURRENT MODERATE EPISODE OF MAJOR DEPRESSIVE DISORDER WITHOUT PRIOR EPISODE (HCC): ICD-10-CM

## 2022-06-14 DIAGNOSIS — H69.80 DYSFUNCTION OF EUSTACHIAN TUBE, UNSPECIFIED LATERALITY: ICD-10-CM

## 2022-06-14 DIAGNOSIS — R53.83 FATIGUE, UNSPECIFIED TYPE: ICD-10-CM

## 2022-06-14 DIAGNOSIS — J45.40 MODERATE PERSISTENT ASTHMA WITHOUT COMPLICATION: ICD-10-CM

## 2022-06-14 LAB
ALBUMIN SERPL BCP-MCNC: 4.3 G/DL (ref 3.5–5)
ALP SERPL-CCNC: 68 U/L (ref 46–116)
ALT SERPL W P-5'-P-CCNC: 34 U/L (ref 12–78)
ANION GAP SERPL CALCULATED.3IONS-SCNC: 3 MMOL/L (ref 4–13)
AST SERPL W P-5'-P-CCNC: 22 U/L (ref 5–45)
BILIRUB SERPL-MCNC: 0.56 MG/DL (ref 0.2–1)
BILIRUB UR QL STRIP: NEGATIVE
BUN SERPL-MCNC: 16 MG/DL (ref 5–25)
CALCIUM SERPL-MCNC: 9.1 MG/DL (ref 8.3–10.1)
CHLORIDE SERPL-SCNC: 108 MMOL/L (ref 100–108)
CHOLEST SERPL-MCNC: 182 MG/DL
CLARITY UR: CLEAR
CO2 SERPL-SCNC: 27 MMOL/L (ref 21–32)
COLOR UR: YELLOW
CREAT SERPL-MCNC: 1.16 MG/DL (ref 0.6–1.3)
ERYTHROCYTE [DISTWIDTH] IN BLOOD BY AUTOMATED COUNT: 12.6 % (ref 11.6–15.1)
GFR SERPL CREATININE-BSD FRML MDRD: 82 ML/MIN/1.73SQ M
GLUCOSE P FAST SERPL-MCNC: 103 MG/DL (ref 65–99)
GLUCOSE UR STRIP-MCNC: NEGATIVE MG/DL
HCT VFR BLD AUTO: 42.6 % (ref 36.5–49.3)
HCV AB SER QL: NORMAL
HDLC SERPL-MCNC: 66 MG/DL
HGB BLD-MCNC: 14.5 G/DL (ref 12–17)
HGB UR QL STRIP.AUTO: NEGATIVE
KETONES UR STRIP-MCNC: NEGATIVE MG/DL
LDLC SERPL CALC-MCNC: 95 MG/DL (ref 0–100)
LEUKOCYTE ESTERASE UR QL STRIP: NEGATIVE
MCH RBC QN AUTO: 33.5 PG (ref 26.8–34.3)
MCHC RBC AUTO-ENTMCNC: 34 G/DL (ref 31.4–37.4)
MCV RBC AUTO: 98 FL (ref 82–98)
NITRITE UR QL STRIP: NEGATIVE
PH UR STRIP.AUTO: 6.5 [PH]
PLATELET # BLD AUTO: 243 THOUSANDS/UL (ref 149–390)
PMV BLD AUTO: 10.1 FL (ref 8.9–12.7)
POTASSIUM SERPL-SCNC: 4.7 MMOL/L (ref 3.5–5.3)
PROT SERPL-MCNC: 7.5 G/DL (ref 6.4–8.2)
PROT UR STRIP-MCNC: NEGATIVE MG/DL
RBC # BLD AUTO: 4.33 MILLION/UL (ref 3.88–5.62)
SODIUM SERPL-SCNC: 138 MMOL/L (ref 136–145)
SP GR UR STRIP.AUTO: 1.02 (ref 1–1.03)
T4 FREE SERPL-MCNC: 0.52 NG/DL (ref 0.76–1.46)
TRIGL SERPL-MCNC: 106 MG/DL
TSH SERPL DL<=0.05 MIU/L-ACNC: 51.8 UIU/ML (ref 0.45–4.5)
UROBILINOGEN UR STRIP-ACNC: <2 MG/DL
WBC # BLD AUTO: 9.05 THOUSAND/UL (ref 4.31–10.16)

## 2022-06-14 PROCEDURE — 80061 LIPID PANEL: CPT

## 2022-06-14 PROCEDURE — 85027 COMPLETE CBC AUTOMATED: CPT

## 2022-06-14 PROCEDURE — 87389 HIV-1 AG W/HIV-1&-2 AB AG IA: CPT

## 2022-06-14 PROCEDURE — 81003 URINALYSIS AUTO W/O SCOPE: CPT

## 2022-06-14 PROCEDURE — 84443 ASSAY THYROID STIM HORMONE: CPT

## 2022-06-14 PROCEDURE — 86803 HEPATITIS C AB TEST: CPT

## 2022-06-14 PROCEDURE — 84439 ASSAY OF FREE THYROXINE: CPT

## 2022-06-14 PROCEDURE — 80053 COMPREHEN METABOLIC PANEL: CPT

## 2022-06-14 PROCEDURE — 36415 COLL VENOUS BLD VENIPUNCTURE: CPT

## 2022-06-15 LAB — HIV 1+2 AB+HIV1 P24 AG SERPL QL IA: NORMAL

## 2022-06-16 ENCOUNTER — OFFICE VISIT (OUTPATIENT)
Dept: FAMILY MEDICINE CLINIC | Facility: CLINIC | Age: 32
End: 2022-06-16
Payer: COMMERCIAL

## 2022-06-16 VITALS
SYSTOLIC BLOOD PRESSURE: 124 MMHG | HEIGHT: 72 IN | BODY MASS INDEX: 33.62 KG/M2 | TEMPERATURE: 97.9 F | DIASTOLIC BLOOD PRESSURE: 84 MMHG | WEIGHT: 248.25 LBS

## 2022-06-16 DIAGNOSIS — E66.9 OBESITY (BMI 30-39.9): ICD-10-CM

## 2022-06-16 DIAGNOSIS — R53.83 FATIGUE, UNSPECIFIED TYPE: ICD-10-CM

## 2022-06-16 DIAGNOSIS — Z13.31 POSITIVE DEPRESSION SCREENING: ICD-10-CM

## 2022-06-16 DIAGNOSIS — H69.80 DYSFUNCTION OF EUSTACHIAN TUBE, UNSPECIFIED LATERALITY: ICD-10-CM

## 2022-06-16 DIAGNOSIS — R73.9 HYPERGLYCEMIA: ICD-10-CM

## 2022-06-16 DIAGNOSIS — F32.1 CURRENT MODERATE EPISODE OF MAJOR DEPRESSIVE DISORDER WITHOUT PRIOR EPISODE (HCC): ICD-10-CM

## 2022-06-16 DIAGNOSIS — J45.40 MODERATE PERSISTENT ASTHMA WITHOUT COMPLICATION: ICD-10-CM

## 2022-06-16 DIAGNOSIS — J30.9 ALLERGIC RHINITIS, UNSPECIFIED SEASONALITY, UNSPECIFIED TRIGGER: ICD-10-CM

## 2022-06-16 DIAGNOSIS — E03.9 ACQUIRED HYPOTHYROIDISM: Primary | ICD-10-CM

## 2022-06-16 PROBLEM — J01.40 ACUTE NON-RECURRENT PANSINUSITIS: Status: RESOLVED | Noted: 2022-02-21 | Resolved: 2022-06-16

## 2022-06-16 PROBLEM — Z20.822 ENCOUNTER BY TELEHEALTH FOR SUSPECTED COVID-19: Status: RESOLVED | Noted: 2022-02-21 | Resolved: 2022-06-16

## 2022-06-16 PROCEDURE — 99214 OFFICE O/P EST MOD 30 MIN: CPT | Performed by: FAMILY MEDICINE

## 2022-06-16 PROCEDURE — 1036F TOBACCO NON-USER: CPT | Performed by: FAMILY MEDICINE

## 2022-06-16 PROCEDURE — 3008F BODY MASS INDEX DOCD: CPT | Performed by: FAMILY MEDICINE

## 2022-06-16 PROCEDURE — 3725F SCREEN DEPRESSION PERFORMED: CPT | Performed by: FAMILY MEDICINE

## 2022-06-16 RX ORDER — LEVOTHYROXINE SODIUM 0.05 MG/1
50 TABLET ORAL
Qty: 30 TABLET | Refills: 5 | Status: SHIPPED | OUTPATIENT
Start: 2022-06-16 | End: 2022-08-08

## 2022-06-16 NOTE — PROGRESS NOTES
Assessment and Plan:  1  Hypothyroidism, discussed symptomatology  Start levothyroxine 50 mcg daily check TSH 6 weeks  2  Allergic rhinitis/eustachian tube dysfunction, stable continue present therapy  3  Asthma, stable continue present therapy patient did not complete PFTs as of yet   4  MDD  5  Positive depression screen  Discussed with patient this could be secondary to his thyroid issue will make you thyroid if still not improved will consider increasing Lexapro  6  BMI 33 67 diet exercise weight loss recommended  7  Very mild hyperglycemia, will continue to monitor I believe this will resolve with making patient euthyroid  8  Patient return in 2 months for office visit will check TSH in 6 weeks     Problem List Items Addressed This Visit        Endocrine    Acquired hypothyroidism - Primary     Discussed symptoms of hypothyroidism  Patient's TSH is 51  Explained I believe most of patient's depressive symptoms might be due to this we will make him euthyroid before addressing any medication change with Lexapro  Levothyroxine 50 mcg daily was ordered  Patient was instructed that is poorly absorbed he needs to take 1st thing in the morning and do not eat or drink for least a half an hour           Relevant Medications    levothyroxine (Euthyrox) 50 mcg tablet    Other Relevant Orders    TSH, 3rd generation with Free T4 reflex       Respiratory    Moderate persistent asthma     Stable continue present therapy, patient complete PFTs that were ordered previously           Allergic rhinitis     Stable continue present therapy              Nervous and Auditory    Eustachian tube dysfunction     As above              Other    Current moderate episode of major depressive disorder without prior episode (HCC)     Mildly positive PHQ-9, however I believe this might be medical secondary is hypothyroidism    Will make you thyroid before increasing Lexapro           Fatigue     Will address thyroid           Obesity (BMI 30-39  9)     BMI 33 67 patient lost 2 lb in spite of being very hypothyroid           Hyperglycemia     Very minimal at 103 I believe secondary to hypothyroid will continue to monitor             Other Visit Diagnoses     Positive depression screening                     Diagnoses and all orders for this visit:    Acquired hypothyroidism  -     levothyroxine (Euthyrox) 50 mcg tablet; Take 1 tablet (50 mcg total) by mouth daily in the early morning  -     TSH, 3rd generation with Free T4 reflex; Future    Allergic rhinitis, unspecified seasonality, unspecified trigger    Moderate persistent asthma without complication    Current moderate episode of major depressive disorder without prior episode (HCC)    Positive depression screening    Fatigue, unspecified type    Obesity (BMI 30-39  9)    Dysfunction of Eustachian tube, unspecified laterality    Hyperglycemia            Subjective:      Patient ID: Flower Brumfield is a 34 y o  male  CC:    Chief Complaint   Patient presents with    Follow-up     Pt states this is his 6 month check up   mgb       HPI:    Patient does feel more fatigued than usual patient believes it might be due to depression  We discussed his very underactive thyroid that might be actually medical condition  Before we increased patient's low Lexapro we addressed his thyroid issue  Blood work was discussed with the patient      The following portions of the patient's history were reviewed and updated as appropriate: allergies, current medications, past family history, past medical history, past social history, past surgical history and problem list       Review of Systems   Constitutional:        HPI   HENT: Negative  Eyes: Negative  Respiratory: Negative  Cardiovascular: Negative  Gastrointestinal: Negative  Endocrine:        HPI   Genitourinary: Negative  Musculoskeletal: Negative  Skin: Negative  Allergic/Immunologic: Negative  Neurological: Negative  Hematological: Negative  Psychiatric/Behavioral:        HPI         Data to review:       Objective:    Vitals:    06/16/22 1736   BP: 124/84   BP Location: Right arm   Patient Position: Sitting   Cuff Size: Large   Temp: 97 9 °F (36 6 °C)   TempSrc: Temporal   Weight: 113 kg (248 lb 4 oz)   Height: 6' (1 829 m)        Physical Exam  Vitals and nursing note reviewed  Constitutional:       Appearance: Normal appearance  HENT:      Head: Normocephalic and atraumatic  Eyes:      General: No scleral icterus  Neck:      Comments: Negative thyromegaly  Cardiovascular:      Rate and Rhythm: Normal rate and regular rhythm  Heart sounds: Normal heart sounds  Pulmonary:      Effort: Pulmonary effort is normal       Breath sounds: Normal breath sounds  Abdominal:      Palpations: Abdomen is soft  Tenderness: There is no abdominal tenderness  Musculoskeletal:      Cervical back: Neck supple  Right lower leg: No edema  Left lower leg: No edema  Skin:     General: Skin is warm and dry  Neurological:      General: No focal deficit present  Mental Status: He is alert  Psychiatric:         Mood and Affect: Mood normal          Behavior: Behavior normal          Thought Content: Thought content normal          Judgment: Judgment normal          Depression Screening Follow-up Plan: Patient's depression screening was positive with a PHQ-2 score of   Their PHQ-9 score was   Patient advised to follow-up with PCP for further management  BMI Counseling: Body mass index is 33 67 kg/m²  The BMI is above normal  Nutrition recommendations include moderation in carbohydrate intake and reducing intake of cholesterol  Exercise recommendations include exercising 3-5 times per week  Rationale for BMI follow-up plan is due to patient being overweight or obese

## 2022-06-16 NOTE — ASSESSMENT & PLAN NOTE
Mildly positive PHQ-9, however I believe this might be medical secondary is hypothyroidism    Will make you thyroid before increasing Lexapro

## 2022-06-16 NOTE — ASSESSMENT & PLAN NOTE
Discussed symptoms of hypothyroidism  Patient's TSH is 51  Explained I believe most of patient's depressive symptoms might be due to this we will make him euthyroid before addressing any medication change with Lexapro  Levothyroxine 50 mcg daily was ordered    Patient was instructed that is poorly absorbed he needs to take 1st thing in the morning and do not eat or drink for least a half an hour

## 2022-06-16 NOTE — PATIENT INSTRUCTIONS
Start levothyroxine 50 mcg tablets 1 tablet daily in the morning, must be an empty stomach do not eat or drink for least a half an hour   Check thyroid blood work in 6 weeks, maybe nonfasting   Recheck 2 months

## 2022-08-04 ENCOUNTER — APPOINTMENT (OUTPATIENT)
Dept: LAB | Facility: MEDICAL CENTER | Age: 32
End: 2022-08-04
Payer: COMMERCIAL

## 2022-08-04 DIAGNOSIS — E03.9 ACQUIRED HYPOTHYROIDISM: ICD-10-CM

## 2022-08-04 LAB
T4 FREE SERPL-MCNC: 0.62 NG/DL (ref 0.76–1.46)
TSH SERPL DL<=0.05 MIU/L-ACNC: 33.1 UIU/ML (ref 0.45–4.5)

## 2022-08-04 PROCEDURE — 84439 ASSAY OF FREE THYROXINE: CPT

## 2022-08-04 PROCEDURE — 84443 ASSAY THYROID STIM HORMONE: CPT

## 2022-08-04 PROCEDURE — 36415 COLL VENOUS BLD VENIPUNCTURE: CPT

## 2022-08-05 ENCOUNTER — TELEPHONE (OUTPATIENT)
Dept: FAMILY MEDICINE CLINIC | Facility: CLINIC | Age: 32
End: 2022-08-05

## 2022-08-05 DIAGNOSIS — E03.9 ACQUIRED HYPOTHYROIDISM: Primary | ICD-10-CM

## 2022-08-05 NOTE — TELEPHONE ENCOUNTER
Patient wife called stating that they received a call from our office stating that TS would like to see the pt to discuss lab results  Wife wanted to know what would be discussed at that visit, they recently changed insurances which requires them to pay for the total cost of the visit not a copay  So wife wanted to know if this appointment is just going to be to adjust the patient medications  If so can that be done over the phone or just called in  The most cost effective way  I advised pt that I do not know what would be discussed and that I would send a message back to the provider but he still may need to be seen in office  Please advise   Thank you

## 2022-08-08 RX ORDER — LEVOTHYROXINE SODIUM 0.1 MG/1
100 TABLET ORAL
Qty: 30 TABLET | Refills: 5 | Status: SHIPPED | OUTPATIENT
Start: 2022-08-08

## 2022-08-08 NOTE — TELEPHONE ENCOUNTER
Patient's thyroid is still very underactive  Increasing patient's levothyroxine from  mcg daily  He may take to the 50s to finish that up    Check TSH in 4 weeks

## 2022-10-08 DIAGNOSIS — E03.9 ACQUIRED HYPOTHYROIDISM: ICD-10-CM

## 2022-10-08 RX ORDER — LEVOTHYROXINE SODIUM 0.1 MG/1
100 TABLET ORAL
Qty: 90 TABLET | Refills: 1 | OUTPATIENT
Start: 2022-10-08

## 2022-11-08 DIAGNOSIS — E03.9 ACQUIRED HYPOTHYROIDISM: ICD-10-CM

## 2022-11-08 RX ORDER — LEVOTHYROXINE SODIUM 0.1 MG/1
100 TABLET ORAL
Qty: 90 TABLET | Refills: 2 | Status: SHIPPED | OUTPATIENT
Start: 2022-11-08

## 2022-11-10 DIAGNOSIS — F32.1 CURRENT MODERATE EPISODE OF MAJOR DEPRESSIVE DISORDER WITHOUT PRIOR EPISODE (HCC): ICD-10-CM

## 2022-11-10 RX ORDER — ESCITALOPRAM OXALATE 10 MG/1
TABLET ORAL
Qty: 90 TABLET | Refills: 1 | Status: SHIPPED | OUTPATIENT
Start: 2022-11-10

## 2022-12-05 ENCOUNTER — OFFICE VISIT (OUTPATIENT)
Dept: FAMILY MEDICINE CLINIC | Facility: CLINIC | Age: 32
End: 2022-12-05

## 2022-12-05 VITALS
SYSTOLIC BLOOD PRESSURE: 128 MMHG | BODY MASS INDEX: 33.67 KG/M2 | TEMPERATURE: 96.5 F | RESPIRATION RATE: 16 BRPM | HEIGHT: 72 IN | DIASTOLIC BLOOD PRESSURE: 80 MMHG | HEART RATE: 80 BPM

## 2022-12-05 DIAGNOSIS — E66.9 OBESITY (BMI 30-39.9): ICD-10-CM

## 2022-12-05 DIAGNOSIS — J30.9 ALLERGIC RHINITIS, UNSPECIFIED SEASONALITY, UNSPECIFIED TRIGGER: ICD-10-CM

## 2022-12-05 DIAGNOSIS — J01.40 ACUTE PANSINUSITIS, RECURRENCE NOT SPECIFIED: ICD-10-CM

## 2022-12-05 DIAGNOSIS — F32.1 CURRENT MODERATE EPISODE OF MAJOR DEPRESSIVE DISORDER WITHOUT PRIOR EPISODE (HCC): ICD-10-CM

## 2022-12-05 DIAGNOSIS — Z23 ENCOUNTER FOR IMMUNIZATION: ICD-10-CM

## 2022-12-05 DIAGNOSIS — J34.89 SINUS PRESSURE: Primary | ICD-10-CM

## 2022-12-05 DIAGNOSIS — R73.9 HYPERGLYCEMIA: ICD-10-CM

## 2022-12-05 DIAGNOSIS — E03.9 ACQUIRED HYPOTHYROIDISM: ICD-10-CM

## 2022-12-05 DIAGNOSIS — J45.40 MODERATE PERSISTENT ASTHMA WITHOUT COMPLICATION: ICD-10-CM

## 2022-12-05 DIAGNOSIS — R09.82 POST-NASAL DRIP: ICD-10-CM

## 2022-12-05 DIAGNOSIS — H69.80 DYSFUNCTION OF EUSTACHIAN TUBE, UNSPECIFIED LATERALITY: ICD-10-CM

## 2022-12-05 LAB
SARS-COV-2 AG UPPER RESP QL IA: NEGATIVE
VALID CONTROL: NORMAL

## 2022-12-05 RX ORDER — CEPHALEXIN 500 MG/1
CAPSULE ORAL
Qty: 21 CAPSULE | Refills: 0 | Status: SHIPPED | OUTPATIENT
Start: 2022-12-05 | End: 2022-12-12

## 2022-12-05 RX ORDER — AZELASTINE 1 MG/ML
2 SPRAY, METERED NASAL 2 TIMES DAILY
Qty: 30 ML | Refills: 5 | Status: SHIPPED | OUTPATIENT
Start: 2022-12-05

## 2022-12-05 NOTE — LETTER
December 5, 2022     Patient: Jael Lainez   YOB: 1990   Date of Visit: 12/5/2022       To Whom It May Concern: It is my medical opinion that Jael Lainez may return to work on 12/07/2022  If you have any questions or concerns, please don't hesitate to call           Sincerely,        Tito Ventura DO    CC: No Recipients

## 2022-12-05 NOTE — PROGRESS NOTES
Name: Kevin Torres      : 1990      MRN: [id number]  Encounter Provider: Patrick Armijo DO  Encounter Date: 2022   Encounter department: [facility]    Assessment & Plan     1  Head congestion  2  Postnasal drip  Rapid COVID was negative   COVID/flu swab sent to lab  3  Acute sinusitis Keflex ordered  4  Per allergic rhinitis/eustachian tube dysfunction, Astelin ordered  5  Asthma, stable lungs are clear  6  Hypothyroidism, blood work ordered  7  Hyperglycemia blood work ordered  8  BMI 33 67 diet exercise weight loss recommended  9  Patient to return in 1 week if still symptoms otherwise return in 4 weeks for office visit blood work  8  Patient remain isolation to report of COVID via PCR is known  6  Return to work on Wednesday sooner if needed         1  Sinus pressure  -     POCT Rapid Covid Ag  -     CBC; Future; Expected date: 2023  -     Comprehensive metabolic panel; Future; Expected date: 2023  -     Hemoglobin A1C; Future; Expected date: 2023  -     Lipid Panel with Direct LDL reflex; Future; Expected date: 2023  -     T4; Future; Expected date: 2023  -     TSH, 3rd generation with Free T4 reflex; Future; Expected date: 2023  -     UA (URINE) with reflex to Scope; Future; Expected date: 2023    2  Post-nasal drip  -     POCT Rapid Covid Ag  -     CBC; Future; Expected date: 2023  -     Comprehensive metabolic panel; Future; Expected date: 2023  -     Hemoglobin A1C; Future; Expected date: 2023  -     Lipid Panel with Direct LDL reflex; Future; Expected date: 2023  -     T4; Future; Expected date: 2023  -     TSH, 3rd generation with Free T4 reflex; Future; Expected date: 2023  -     UA (URINE) with reflex to Scope; Future; Expected date: 2023    3  Acute pansinusitis, recurrence not specified  -     CBC; Future; Expected date: 2023  -     Comprehensive metabolic panel;  Future; Expected date: 01/02/2023  -     Hemoglobin A1C; Future; Expected date: 01/02/2023  -     Lipid Panel with Direct LDL reflex; Future; Expected date: 01/02/2023  -     T4; Future; Expected date: 01/02/2023  -     TSH, 3rd generation with Free T4 reflex; Future; Expected date: 01/02/2023  -     UA (URINE) with reflex to Scope; Future; Expected date: 01/02/2023  -     cephalexin (KEFLEX) 500 mg capsule; Take 1 capsule 3 times daily for 1 week    4  Allergic rhinitis, unspecified seasonality, unspecified trigger  Assessment & Plan:  Astelin reordered    Orders:  -     CBC; Future; Expected date: 01/02/2023  -     Comprehensive metabolic panel; Future; Expected date: 01/02/2023  -     Hemoglobin A1C; Future; Expected date: 01/02/2023  -     Lipid Panel with Direct LDL reflex; Future; Expected date: 01/02/2023  -     T4; Future; Expected date: 01/02/2023  -     TSH, 3rd generation with Free T4 reflex; Future; Expected date: 01/02/2023  -     UA (URINE) with reflex to Scope; Future; Expected date: 01/02/2023  -     azelastine (ASTELIN) 0 1 % nasal spray; 2 sprays into each nostril 2 (two) times a day    5  Moderate persistent asthma without complication  Assessment & Plan:  Stable, lungs are clear    Orders:  -     CBC; Future; Expected date: 01/02/2023  -     Comprehensive metabolic panel; Future; Expected date: 01/02/2023  -     Hemoglobin A1C; Future; Expected date: 01/02/2023  -     Lipid Panel with Direct LDL reflex; Future; Expected date: 01/02/2023  -     T4; Future; Expected date: 01/02/2023  -     TSH, 3rd generation with Free T4 reflex; Future; Expected date: 01/02/2023  -     UA (URINE) with reflex to Scope; Future; Expected date: 01/02/2023    6  Dysfunction of Eustachian tube, unspecified laterality  Assessment & Plan:  As above    Orders:  -     CBC; Future; Expected date: 01/02/2023  -     Comprehensive metabolic panel;  Future; Expected date: 01/02/2023  -     Hemoglobin A1C; Future; Expected date: 01/02/2023  -     Lipid Panel with Direct LDL reflex; Future; Expected date: 01/02/2023  -     T4; Future; Expected date: 01/02/2023  -     TSH, 3rd generation with Free T4 reflex; Future; Expected date: 01/02/2023  -     UA (URINE) with reflex to Scope; Future; Expected date: 01/02/2023  -     azelastine (ASTELIN) 0 1 % nasal spray; 2 sprays into each nostril 2 (two) times a day    7  Acquired hypothyroidism  Assessment & Plan:  Blood work ordered    Orders:  -     CBC; Future; Expected date: 01/02/2023  -     Comprehensive metabolic panel; Future; Expected date: 01/02/2023  -     Hemoglobin A1C; Future; Expected date: 01/02/2023  -     Lipid Panel with Direct LDL reflex; Future; Expected date: 01/02/2023  -     T4; Future; Expected date: 01/02/2023  -     TSH, 3rd generation with Free T4 reflex; Future; Expected date: 01/02/2023  -     UA (URINE) with reflex to Scope; Future; Expected date: 01/02/2023    8  Current moderate episode of major depressive disorder without prior episode ([address] )  Assessment & Plan:  Stable on Lexapro    Orders:  -     CBC; Future; Expected date: 01/02/2023  -     Comprehensive metabolic panel; Future; Expected date: 01/02/2023  -     Hemoglobin A1C; Future; Expected date: 01/02/2023  -     Lipid Panel with Direct LDL reflex; Future; Expected date: 01/02/2023  -     T4; Future; Expected date: 01/02/2023  -     TSH, 3rd generation with Free T4 reflex; Future; Expected date: 01/02/2023  -     UA (URINE) with reflex to Scope; Future; Expected date: 01/02/2023    9  Hyperglycemia  Assessment & Plan:  Blood work ordered    Orders:  -     CBC; Future; Expected date: 01/02/2023  -     Comprehensive metabolic panel; Future; Expected date: 01/02/2023  -     Hemoglobin A1C; Future; Expected date: 01/02/2023  -     Lipid Panel with Direct LDL reflex; Future; Expected date: 01/02/2023  -     T4; Future; Expected date: 01/02/2023  -     TSH, 3rd generation with Free T4 reflex;  Future; Expected date: 01/02/2023  -     UA (URINE) with reflex to Scope; Future; Expected date: 01/02/2023    10  Obesity (BMI 30-39  9)  Assessment & Plan:  BMI 33 67 diet exercise weight loss recommend    Orders:  -     CBC; Future; Expected date: 01/02/2023  -     Comprehensive metabolic panel; Future; Expected date: 01/02/2023  -     Hemoglobin A1C; Future; Expected date: 01/02/2023  -     Lipid Panel with Direct LDL reflex; Future; Expected date: 01/02/2023  -     T4; Future; Expected date: 01/02/2023  -     TSH, 3rd generation with Free T4 reflex; Future; Expected date: 01/02/2023  -     UA (URINE) with reflex to Scope; Future; Expected date: 01/02/2023           Subjective      For the past week patient has had postnasal drip head congestion sinus pain and pressure  Patient is using NyQuil with limited relief  Patient did not do home COVID test patient is un- immunized for COVID    Review of Systems   Constitutional:        Believes he may havefever last week   HENT:        HPI   Eyes: Negative  Respiratory: Negative for cough  Cardiovascular: Negative  Gastrointestinal: Negative  Endocrine: Negative  Genitourinary: Negative  Musculoskeletal: Negative  Skin: Negative  Allergic/Immunologic: Positive for environmental allergies  Neurological: Negative  Hematological: Negative  Psychiatric/Behavioral: Negative  Current Outpatient Medications on File Prior to Visit   Medication Sig   • Albuterol Sulfate 108 (90 Base) MCG/ACT AEPB Inhale 2 sprays every 4 (four) hours as needed (wheeze)   • cetirizine (ZyrTEC) 10 mg tablet Take 1 tablet by mouth daily   • escitalopram (LEXAPRO) 10 mg tablet TAKE 1 TABLET BY MOUTH EVERY DAY   • fluticasone-salmeterol (Advair Diskus) 500-50 mcg/dose inhaler Inhale 1 puff 2 (two) times a day Rinse mouth after use     • levothyroxine 100 mcg tablet TAKE 1 TABLET (100 MCG TOTAL) BY MOUTH DAILY IN THE EARLY MORNING   • montelukast (SINGULAIR) 10 mg tablet Take 1 tablet (10 mg total) by mouth daily   • [DISCONTINUED] azelastine (ASTELIN) 0 1 % nasal spray 2 sprays into each nostril 2 (two) times a day       Objective     /80   Pulse 80   Temp (!) 96 5 °F (35 8 °C) (Tympanic)   Resp 16   Ht 6' (1 829 m)   BMI 33 67 kg/m²     Physical Exam  Vitals and nursing note reviewed  Constitutional:       Appearance: Normal appearance  HENT:      Head: Normocephalic and atraumatic  Ears:      Comments: Tympanic membranes are dull no injection no fluid     Nose:      Comments: Positive allergic turbinates positive pansinus tenderness to percussion     Mouth/Throat:      Comments: Scant off-white postnasal drip minimal pharyngeal injection negative exudate  Eyes:      General: No scleral icterus  Neck:      Comments: Positive shotty cervical adenopathy  Cardiovascular:      Rate and Rhythm: Normal rate and regular rhythm  Heart sounds: Normal heart sounds  Pulmonary:      Effort: Pulmonary effort is normal       Breath sounds: Normal breath sounds  Musculoskeletal:      Cervical back: Neck supple  Right lower leg: No edema  Left lower leg: No edema  Lymphadenopathy:      Cervical: Cervical adenopathy present  Skin:     General: Skin is warm and dry  Neurological:      General: No focal deficit present  Mental Status: He is alert     Psychiatric:         Mood and Affect: Mood normal        Tito Ventura DO

## 2022-12-05 NOTE — PATIENT INSTRUCTIONS
Finish antibiotic, Keflex 500 mg 3 times daily for 1 week   Use Astelin nasal spray 2 sprays both nostrils twice daily   Remain in isolation to report of COVID test is known from lab   May return to work on Wednesday if COVID negative   Diet exercise weight loss recommended  Return in 1 month for office visit blood work sooner if needed

## 2022-12-06 LAB
FLUAV RNA RESP QL NAA+PROBE: NEGATIVE
FLUBV RNA RESP QL NAA+PROBE: NEGATIVE
SARS-COV-2 RNA RESP QL NAA+PROBE: NEGATIVE

## 2023-01-04 ENCOUNTER — APPOINTMENT (OUTPATIENT)
Dept: LAB | Age: 33
End: 2023-01-04

## 2023-01-04 DIAGNOSIS — E66.9 OBESITY (BMI 30-39.9): ICD-10-CM

## 2023-01-04 DIAGNOSIS — F32.1 CURRENT MODERATE EPISODE OF MAJOR DEPRESSIVE DISORDER WITHOUT PRIOR EPISODE (HCC): ICD-10-CM

## 2023-01-04 DIAGNOSIS — H69.80 DYSFUNCTION OF EUSTACHIAN TUBE, UNSPECIFIED LATERALITY: ICD-10-CM

## 2023-01-04 DIAGNOSIS — R09.82 POST-NASAL DRIP: ICD-10-CM

## 2023-01-04 DIAGNOSIS — J01.40 ACUTE PANSINUSITIS, RECURRENCE NOT SPECIFIED: ICD-10-CM

## 2023-01-04 DIAGNOSIS — J30.9 ALLERGIC RHINITIS, UNSPECIFIED SEASONALITY, UNSPECIFIED TRIGGER: ICD-10-CM

## 2023-01-04 DIAGNOSIS — J45.40 MODERATE PERSISTENT ASTHMA WITHOUT COMPLICATION: ICD-10-CM

## 2023-01-04 DIAGNOSIS — J34.89 SINUS PRESSURE: ICD-10-CM

## 2023-01-04 DIAGNOSIS — E03.9 ACQUIRED HYPOTHYROIDISM: ICD-10-CM

## 2023-01-04 DIAGNOSIS — R73.9 HYPERGLYCEMIA: ICD-10-CM

## 2023-01-04 LAB
ALBUMIN SERPL BCP-MCNC: 4.2 G/DL (ref 3.5–5)
ALP SERPL-CCNC: 71 U/L (ref 46–116)
ALT SERPL W P-5'-P-CCNC: 92 U/L (ref 12–78)
ANION GAP SERPL CALCULATED.3IONS-SCNC: 6 MMOL/L (ref 4–13)
AST SERPL W P-5'-P-CCNC: 67 U/L (ref 5–45)
BILIRUB SERPL-MCNC: 0.22 MG/DL (ref 0.2–1)
BUN SERPL-MCNC: 16 MG/DL (ref 5–25)
CALCIUM SERPL-MCNC: 9.1 MG/DL (ref 8.3–10.1)
CHLORIDE SERPL-SCNC: 107 MMOL/L (ref 96–108)
CHOLEST SERPL-MCNC: 207 MG/DL
CO2 SERPL-SCNC: 26 MMOL/L (ref 21–32)
CREAT SERPL-MCNC: 1.12 MG/DL (ref 0.6–1.3)
ERYTHROCYTE [DISTWIDTH] IN BLOOD BY AUTOMATED COUNT: 12.4 % (ref 11.6–15.1)
GFR SERPL CREATININE-BSD FRML MDRD: 85 ML/MIN/1.73SQ M
GLUCOSE P FAST SERPL-MCNC: 104 MG/DL (ref 65–99)
HCT VFR BLD AUTO: 43.7 % (ref 36.5–49.3)
HDLC SERPL-MCNC: 77 MG/DL
HGB BLD-MCNC: 15 G/DL (ref 12–17)
LDLC SERPL CALC-MCNC: 102 MG/DL (ref 0–100)
MCH RBC QN AUTO: 32.8 PG (ref 26.8–34.3)
MCHC RBC AUTO-ENTMCNC: 34.3 G/DL (ref 31.4–37.4)
MCV RBC AUTO: 96 FL (ref 82–98)
PLATELET # BLD AUTO: 220 THOUSANDS/UL (ref 149–390)
PMV BLD AUTO: 9.9 FL (ref 8.9–12.7)
POTASSIUM SERPL-SCNC: 4.1 MMOL/L (ref 3.5–5.3)
PROT SERPL-MCNC: 7.6 G/DL (ref 6.4–8.4)
RBC # BLD AUTO: 4.57 MILLION/UL (ref 3.88–5.62)
SODIUM SERPL-SCNC: 139 MMOL/L (ref 135–147)
T4 FREE SERPL-MCNC: 0.73 NG/DL (ref 0.76–1.46)
T4 SERPL-MCNC: 6.3 UG/DL (ref 4.7–13.3)
TRIGL SERPL-MCNC: 141 MG/DL
TSH SERPL DL<=0.05 MIU/L-ACNC: 24.2 UIU/ML (ref 0.45–4.5)
WBC # BLD AUTO: 6.32 THOUSAND/UL (ref 4.31–10.16)

## 2023-01-05 ENCOUNTER — OFFICE VISIT (OUTPATIENT)
Dept: FAMILY MEDICINE CLINIC | Facility: CLINIC | Age: 33
End: 2023-01-05

## 2023-01-05 VITALS
TEMPERATURE: 97 F | HEART RATE: 100 BPM | WEIGHT: 269.13 LBS | OXYGEN SATURATION: 94 % | SYSTOLIC BLOOD PRESSURE: 124 MMHG | DIASTOLIC BLOOD PRESSURE: 82 MMHG | BODY MASS INDEX: 36.5 KG/M2

## 2023-01-05 DIAGNOSIS — E03.9 ACQUIRED HYPOTHYROIDISM: Primary | ICD-10-CM

## 2023-01-05 DIAGNOSIS — J45.40 MODERATE PERSISTENT ASTHMA WITHOUT COMPLICATION: ICD-10-CM

## 2023-01-05 DIAGNOSIS — F32.1 CURRENT MODERATE EPISODE OF MAJOR DEPRESSIVE DISORDER WITHOUT PRIOR EPISODE (HCC): ICD-10-CM

## 2023-01-05 DIAGNOSIS — E78.00 HYPERCHOLESTEROLEMIA: ICD-10-CM

## 2023-01-05 DIAGNOSIS — R74.01 TRANSAMINITIS: ICD-10-CM

## 2023-01-05 DIAGNOSIS — R73.9 HYPERGLYCEMIA: ICD-10-CM

## 2023-01-05 DIAGNOSIS — E66.01 SEVERE OBESITY (BMI 35.0-39.9) WITH COMORBIDITY (HCC): ICD-10-CM

## 2023-01-05 PROBLEM — E66.9 OBESITY (BMI 30-39.9): Status: RESOLVED | Noted: 2020-10-12 | Resolved: 2023-01-05

## 2023-01-05 LAB
EST. AVERAGE GLUCOSE BLD GHB EST-MCNC: 103 MG/DL
HBA1C MFR BLD: 5.2 %

## 2023-01-05 RX ORDER — FLUTICASONE PROPIONATE AND SALMETEROL 500; 50 UG/1; UG/1
1 POWDER RESPIRATORY (INHALATION) 2 TIMES DAILY
Qty: 60 BLISTER | Refills: 11 | Status: SHIPPED | OUTPATIENT
Start: 2023-01-05

## 2023-01-05 RX ORDER — LEVOTHYROXINE SODIUM 0.12 MG/1
125 TABLET ORAL
Qty: 30 TABLET | Refills: 5 | Status: SHIPPED | OUTPATIENT
Start: 2023-01-05

## 2023-01-06 NOTE — PATIENT INSTRUCTIONS
Discontinue levothyroxine 100 mcg   Start levothyroxine 125 mcg daily   Check thyroid and liver blood work in 4 weeks, nonfasting   Low sugar low-fat diet recommended   Return in 6 months for office visit blood work sooner if needed

## 2023-01-06 NOTE — ASSESSMENT & PLAN NOTE
BMI 36 5 diet exercise weight loss recommended hopefully making patient euthyroid will assist him in losing weight

## 2023-01-06 NOTE — PROGRESS NOTES
Name: Margarita Uriostegui      : 1990      MRN: [id number]  Encounter Provider: Micaela Hensley DO  Encounter Date: 2023   Encounter department: [facility] PRIMARY CARE    Assessment & Plan     1  Hypothyroidism, not at goal increase levothyroxine from 100 up to 125 mcg daily check TSH in 4 weeks  2  Asthma, stable generic Advair was ordered   3  Transaminitis, repeat liver functions have panel in 4 weeks  4  Hyperglycemia diet-controlled  5  Hyperlipidemia, low-fat low-cholesterol diet recommended I believe once patient's may euthyroid this will help   6  BMI 36 50  Diet exercise weight loss recommended  In addition I believe making euthyroid will assist him with weight loss  7  MDD, stable continue present therapy  8  Return in 6 months for office visit blood work sooner if needed  1  Acquired hypothyroidism  Assessment & Plan:   T4 low TSH elevated increase levothyroxine from 100-125 mcg check TSH in 4 weeks    Orders:  -     TSH, 3rd generation with Free T4 reflex; Future; Expected date: 2023  -     Hepatitis panel, acute; Future; Expected date: 2023  -     Hepatic function panel; Future; Expected date: 2023  -     levothyroxine (Euthyrox) 125 mcg tablet; Take 1 tablet (125 mcg total) by mouth daily in the early morning  -     CBC; Future; Expected date: 2023  -     Comprehensive metabolic panel; Future; Expected date: 2023  -     Hemoglobin A1C; Future; Expected date: 2023  -     Lipid Panel with Direct LDL reflex; Future; Expected date: 2023  -     TSH, 3rd generation with Free T4 reflex; Future; Expected date: 2023    2  Transaminitis  Assessment & Plan:   Repeat in 4 weeks with hepatitis panel    Orders:  -     TSH, 3rd generation with Free T4 reflex; Future; Expected date: 2023  -     Hepatitis panel, acute; Future; Expected date: 2023  -     Hepatic function panel; Future; Expected date: 2023  -     CBC;  Future; Expected date: 07/07/2023  -     Comprehensive metabolic panel; Future; Expected date: 07/07/2023  -     Hemoglobin A1C; Future; Expected date: 07/07/2023  -     Lipid Panel with Direct LDL reflex; Future; Expected date: 07/07/2023  -     TSH, 3rd generation with Free T4 reflex; Future; Expected date: 07/07/2023    3  Hyperglycemia  Assessment & Plan:   Hemoglobin A1c still pending fasting 104    Orders:  -     CBC; Future; Expected date: 07/07/2023  -     Comprehensive metabolic panel; Future; Expected date: 07/07/2023  -     Hemoglobin A1C; Future; Expected date: 07/07/2023  -     Lipid Panel with Direct LDL reflex; Future; Expected date: 07/07/2023  -     TSH, 3rd generation with Free T4 reflex; Future; Expected date: 07/07/2023    4  Hypercholesterolemia  Assessment & Plan:   Low-fat diet recommended    Orders:  -     CBC; Future; Expected date: 07/07/2023  -     Comprehensive metabolic panel; Future; Expected date: 07/07/2023  -     Hemoglobin A1C; Future; Expected date: 07/07/2023  -     Lipid Panel with Direct LDL reflex; Future; Expected date: 07/07/2023  -     TSH, 3rd generation with Free T4 reflex; Future; Expected date: 07/07/2023    5  Current moderate episode of major depressive disorder [facility]  Assessment & Plan:   Stable Lexapro 10 mg daily    Orders:  -     CBC; Future; Expected date: 07/07/2023  -     Comprehensive metabolic panel; Future; Expected date: 07/07/2023  -     Hemoglobin A1C; Future; Expected date: 07/07/2023  -     Lipid Panel with Direct LDL reflex; Future; Expected date: 07/07/2023  -     TSH, 3rd generation with Free T4 reflex; Future; Expected date: 07/07/2023    6  Moderate persistent asthma without complication  Assessment & Plan:   Advair reordered, with generic    Orders:  -     Fluticasone-Salmeterol (Advair) 500-50 mcg/dose inhaler; Inhale 1 puff 2 (two) times a day Rinse mouth after use  -     CBC;  Future; Expected date: 07/07/2023  -     Comprehensive metabolic panel; Future; Expected date: 07/07/2023  -     Hemoglobin A1C; Future; Expected date: 07/07/2023  -     Lipid Panel with Direct LDL reflex; Future; Expected date: 07/07/2023  -     TSH, 3rd generation with Free T4 reflex; Future; Expected date: 07/07/2023    7  Severe obesity (BMI 35 0-39  9) with comorbidity ([address] )  Assessment & Plan:   BMI 36 5 diet exercise weight loss recommended hopefully making patient euthyroid will assist him in losing weight    Orders:  -     CBC; Future; Expected date: 07/07/2023  -     Comprehensive metabolic panel; Future; Expected date: 07/07/2023  -     Hemoglobin A1C; Future; Expected date: 07/07/2023  -     Lipid Panel with Direct LDL reflex; Future; Expected date: 07/07/2023  -     TSH, 3rd generation with Free T4 reflex; Future; Expected date: 07/07/2023      BMI Counseling: Body mass index is 36 5 kg/m²  The BMI is above normal  Nutrition recommendations include moderation in carbohydrate intake and reducing intake of cholesterol  Exercise recommendations include exercising 3-5 times per week  Rationale for BMI follow-up plan is due to patient being overweight or obese  Subjective       Patient doing well his only complaint is that he needs "generic Advair"  His insurance is mentating this  Blood work was discussed with the patient  Patient gained 21 lb    Review of Systems   Constitutional:         HPI   HENT: Negative  Eyes: Negative  Respiratory: Negative  Cardiovascular: Negative  Gastrointestinal: Negative  Endocrine: Negative  Genitourinary: Negative  Musculoskeletal: Negative  Skin: Negative  Allergic/Immunologic: Negative  Neurological: Negative  Hematological: Negative  Psychiatric/Behavioral: Negative          Current Outpatient Medications on File Prior to Visit   Medication Sig   • Albuterol Sulfate 108 (90 Base) MCG/ACT AEPB Inhale 2 sprays every 4 (four) hours as needed (wheeze)   • azelastine (ASTELIN) 0 1 % nasal spray 2 sprays into each nostril 2 (two) times a day   • cetirizine (ZyrTEC) 10 mg tablet Take 1 tablet by mouth daily   • escitalopram (LEXAPRO) 10 mg tablet TAKE 1 TABLET BY MOUTH EVERY DAY   • montelukast (SINGULAIR) 10 mg tablet Take 1 tablet (10 mg total) by mouth daily   • [DISCONTINUED] fluticasone-salmeterol (Advair Diskus) 500-50 mcg/dose inhaler Inhale 1 puff 2 (two) times a day Rinse mouth after use  • [DISCONTINUED] levothyroxine 100 mcg tablet TAKE 1 TABLET (100 MCG TOTAL) BY MOUTH DAILY IN THE EARLY MORNING       Objective     /82   Pulse 100   Temp (!) 97 °F (36 1 °C) (Temporal)   Wt 122 kg (269 lb 2 oz)   SpO2 94%   BMI 36 50 kg/m²     Physical Exam  Vitals and nursing note reviewed  Constitutional:       Appearance: Normal appearance  HENT:      Head: Normocephalic and atraumatic  Mouth/Throat:      Mouth: Mucous membranes are moist    Eyes:      General: No scleral icterus  Neck:      Vascular: No carotid bruit  Cardiovascular:      Rate and Rhythm: Normal rate and regular rhythm  Heart sounds: Normal heart sounds  Pulmonary:      Effort: Pulmonary effort is normal       Breath sounds: Normal breath sounds  Abdominal:      General: Bowel sounds are normal       Palpations: Abdomen is soft  Tenderness: There is no abdominal tenderness  Musculoskeletal:      Cervical back: Neck supple  Right lower leg: No edema  Left lower leg: No edema  Skin:     General: Skin is warm and dry  Neurological:      General: No focal deficit present  Mental Status: He is alert     Psychiatric:         Mood and Affect: Mood normal        Tito Ventura DO

## 2023-02-06 ENCOUNTER — APPOINTMENT (OUTPATIENT)
Dept: LAB | Age: 33
End: 2023-02-06

## 2023-02-06 DIAGNOSIS — E03.9 ACQUIRED HYPOTHYROIDISM: ICD-10-CM

## 2023-02-06 DIAGNOSIS — R74.01 TRANSAMINITIS: ICD-10-CM

## 2023-02-06 LAB
ALBUMIN SERPL BCP-MCNC: 4.1 G/DL (ref 3.5–5)
ALP SERPL-CCNC: 71 U/L (ref 46–116)
ALT SERPL W P-5'-P-CCNC: 102 U/L (ref 12–78)
AST SERPL W P-5'-P-CCNC: 50 U/L (ref 5–45)
BILIRUB DIRECT SERPL-MCNC: 0.12 MG/DL (ref 0–0.2)
BILIRUB SERPL-MCNC: 0.68 MG/DL (ref 0.2–1)
HAV IGM SER QL: NORMAL
HBV CORE IGM SER QL: NORMAL
HBV SURFACE AG SER QL: NORMAL
HCV AB SER QL: NORMAL
PROT SERPL-MCNC: 7.6 G/DL (ref 6.4–8.4)
T4 FREE SERPL-MCNC: 0.82 NG/DL (ref 0.76–1.46)
TSH SERPL DL<=0.05 MIU/L-ACNC: 6.08 UIU/ML (ref 0.45–4.5)

## 2023-02-06 RX ORDER — LEVOTHYROXINE SODIUM 0.12 MG/1
125 TABLET ORAL
Qty: 30 TABLET | Refills: 5 | Status: SHIPPED | OUTPATIENT
Start: 2023-02-06

## 2023-02-07 ENCOUNTER — TELEPHONE (OUTPATIENT)
Dept: FAMILY MEDICINE CLINIC | Facility: CLINIC | Age: 33
End: 2023-02-07

## 2023-02-07 DIAGNOSIS — R74.01 TRANSAMINITIS: Primary | ICD-10-CM

## 2023-02-07 NOTE — TELEPHONE ENCOUNTER
Patient called into the office in regards of the ultrasound order Dr Audrey gibson  Patient would like a call back to explain on why he needs to get ultrasound done  Please advise  Thank You

## 2023-02-10 ENCOUNTER — HOSPITAL ENCOUNTER (OUTPATIENT)
Dept: RADIOLOGY | Facility: IMAGING CENTER | Age: 33
Discharge: HOME | End: 2023-02-10

## 2023-02-10 DIAGNOSIS — R74.01 TRANSAMINITIS: ICD-10-CM

## 2023-02-13 ENCOUNTER — TELEPHONE (OUTPATIENT)
Dept: FAMILY MEDICINE CLINIC | Facility: CLINIC | Age: 33
End: 2023-02-13

## 2023-02-13 NOTE — TELEPHONE ENCOUNTER
Called pt's spouse back, explained reason why ultrasound was ordered and that it can take a good week for results

## 2023-02-13 NOTE — TELEPHONE ENCOUNTER
Patient wife called in and asked for a call back, they have a few questions about the ultrasound and why was it even ordered to be begin with  Wife asked if a call back to explain because her and the patient are worried  Please advise   Thank you

## 2023-02-20 PROBLEM — K75.81 NONALCOHOLIC STEATOHEPATITIS (NASH): Status: ACTIVE | Noted: 2023-02-20

## 2023-08-03 ENCOUNTER — OFFICE VISIT (OUTPATIENT)
Dept: FAMILY MEDICINE CLINIC | Facility: CLINIC | Age: 33
End: 2023-08-03
Payer: COMMERCIAL

## 2023-08-03 ENCOUNTER — APPOINTMENT (OUTPATIENT)
Dept: LAB | Age: 33
End: 2023-08-03
Payer: COMMERCIAL

## 2023-08-03 VITALS
WEIGHT: 264 LBS | DIASTOLIC BLOOD PRESSURE: 78 MMHG | HEIGHT: 72 IN | RESPIRATION RATE: 18 BRPM | SYSTOLIC BLOOD PRESSURE: 124 MMHG | HEART RATE: 69 BPM | OXYGEN SATURATION: 97 % | BODY MASS INDEX: 35.76 KG/M2

## 2023-08-03 DIAGNOSIS — J45.40 MODERATE PERSISTENT ASTHMA WITHOUT COMPLICATION: ICD-10-CM

## 2023-08-03 DIAGNOSIS — R73.9 HYPERGLYCEMIA: ICD-10-CM

## 2023-08-03 DIAGNOSIS — Z13.31 POSITIVE DEPRESSION SCREENING: ICD-10-CM

## 2023-08-03 DIAGNOSIS — F32.1 CURRENT MODERATE EPISODE OF MAJOR DEPRESSIVE DISORDER WITHOUT PRIOR EPISODE (HCC): ICD-10-CM

## 2023-08-03 DIAGNOSIS — E03.9 ACQUIRED HYPOTHYROIDISM: ICD-10-CM

## 2023-08-03 DIAGNOSIS — K75.81 NONALCOHOLIC STEATOHEPATITIS (NASH): ICD-10-CM

## 2023-08-03 DIAGNOSIS — R74.01 TRANSAMINITIS: ICD-10-CM

## 2023-08-03 DIAGNOSIS — E78.00 HYPERCHOLESTEROLEMIA: ICD-10-CM

## 2023-08-03 DIAGNOSIS — E66.01 SEVERE OBESITY (BMI 35.0-39.9) WITH COMORBIDITY (HCC): ICD-10-CM

## 2023-08-03 DIAGNOSIS — E03.9 ACQUIRED HYPOTHYROIDISM: Primary | ICD-10-CM

## 2023-08-03 LAB
ALBUMIN SERPL BCP-MCNC: 3.9 G/DL (ref 3.5–5)
ALP SERPL-CCNC: 70 U/L (ref 46–116)
ALT SERPL W P-5'-P-CCNC: 57 U/L (ref 12–78)
ANION GAP SERPL CALCULATED.3IONS-SCNC: 5 MMOL/L
AST SERPL W P-5'-P-CCNC: 23 U/L (ref 5–45)
BILIRUB DIRECT SERPL-MCNC: 0.12 MG/DL (ref 0–0.2)
BILIRUB SERPL-MCNC: 0.48 MG/DL (ref 0.2–1)
BILIRUB UR QL STRIP: NEGATIVE
BUN SERPL-MCNC: 26 MG/DL (ref 5–25)
CALCIUM SERPL-MCNC: 9.1 MG/DL (ref 8.3–10.1)
CHLORIDE SERPL-SCNC: 110 MMOL/L (ref 96–108)
CHOLEST SERPL-MCNC: 254 MG/DL
CLARITY UR: CLEAR
CO2 SERPL-SCNC: 27 MMOL/L (ref 21–32)
COLOR UR: NORMAL
CREAT SERPL-MCNC: 1.14 MG/DL (ref 0.6–1.3)
ERYTHROCYTE [DISTWIDTH] IN BLOOD BY AUTOMATED COUNT: 12.5 % (ref 11.6–15.1)
EST. AVERAGE GLUCOSE BLD GHB EST-MCNC: 111 MG/DL
GFR SERPL CREATININE-BSD FRML MDRD: 83 ML/MIN/1.73SQ M
GLUCOSE P FAST SERPL-MCNC: 102 MG/DL (ref 65–99)
GLUCOSE UR STRIP-MCNC: NEGATIVE MG/DL
HBA1C MFR BLD: 5.5 %
HCT VFR BLD AUTO: 44.3 % (ref 36.5–49.3)
HDLC SERPL-MCNC: 72 MG/DL
HGB BLD-MCNC: 15.3 G/DL (ref 12–17)
HGB UR QL STRIP.AUTO: NEGATIVE
KETONES UR STRIP-MCNC: NEGATIVE MG/DL
LDLC SERPL CALC-MCNC: 166 MG/DL (ref 0–100)
LEUKOCYTE ESTERASE UR QL STRIP: NEGATIVE
MCH RBC QN AUTO: 33.6 PG (ref 26.8–34.3)
MCHC RBC AUTO-ENTMCNC: 34.5 G/DL (ref 31.4–37.4)
MCV RBC AUTO: 97 FL (ref 82–98)
NITRITE UR QL STRIP: NEGATIVE
PH UR STRIP.AUTO: 7 [PH]
PLATELET # BLD AUTO: 233 THOUSANDS/UL (ref 149–390)
PMV BLD AUTO: 10.2 FL (ref 8.9–12.7)
POTASSIUM SERPL-SCNC: 4.3 MMOL/L (ref 3.5–5.3)
PROT SERPL-MCNC: 7.3 G/DL (ref 6.4–8.4)
PROT UR STRIP-MCNC: NEGATIVE MG/DL
RBC # BLD AUTO: 4.55 MILLION/UL (ref 3.88–5.62)
SODIUM SERPL-SCNC: 142 MMOL/L (ref 135–147)
SP GR UR STRIP.AUTO: 1.02 (ref 1–1.03)
T4 FREE SERPL-MCNC: 0.55 NG/DL (ref 0.61–1.12)
TRIGL SERPL-MCNC: 79 MG/DL
TSH SERPL DL<=0.05 MIU/L-ACNC: 11.22 UIU/ML (ref 0.45–4.5)
UROBILINOGEN UR STRIP-ACNC: <2 MG/DL
WBC # BLD AUTO: 6.23 THOUSAND/UL (ref 4.31–10.16)

## 2023-08-03 PROCEDURE — 99214 OFFICE O/P EST MOD 30 MIN: CPT | Performed by: FAMILY MEDICINE

## 2023-08-03 PROCEDURE — 85027 COMPLETE CBC AUTOMATED: CPT

## 2023-08-03 PROCEDURE — 84439 ASSAY OF FREE THYROXINE: CPT

## 2023-08-03 PROCEDURE — 84443 ASSAY THYROID STIM HORMONE: CPT

## 2023-08-03 PROCEDURE — 80053 COMPREHEN METABOLIC PANEL: CPT

## 2023-08-03 PROCEDURE — 83036 HEMOGLOBIN GLYCOSYLATED A1C: CPT

## 2023-08-03 PROCEDURE — 82248 BILIRUBIN DIRECT: CPT

## 2023-08-03 PROCEDURE — 36415 COLL VENOUS BLD VENIPUNCTURE: CPT

## 2023-08-03 PROCEDURE — 80061 LIPID PANEL: CPT

## 2023-08-03 RX ORDER — LEVOTHYROXINE SODIUM 0.15 MG/1
150 TABLET ORAL
Qty: 30 TABLET | Refills: 5 | Status: SHIPPED | OUTPATIENT
Start: 2023-08-03

## 2023-08-03 RX ORDER — BUPROPION HYDROCHLORIDE 150 MG/1
150 TABLET ORAL EVERY MORNING
Qty: 30 TABLET | Refills: 5 | Status: SHIPPED | OUTPATIENT
Start: 2023-08-03 | End: 2024-01-30

## 2023-08-03 NOTE — PATIENT INSTRUCTIONS
Thyroxine 125  Start levothyroxine 150 daily  Continue Lexapro  And Wellbutrin Exar 150 mg daily  Follow with behavioral health  Check thyroid blood work 4 weeks, nonfasting  Recheck 6 weeks sooner if needed

## 2023-08-03 NOTE — PROGRESS NOTES
Name: Hunter Perez      : 1990      MRN: [id number]  Encounter Provider: Ned Helm DO  Encounter Date: 8/3/2023   Encounter department: [facility] PRIMARY CARE    Assessment & Plan      1. Hypothyroidism, TSH is 11.2. Discontinue levothyroxine 125. Start levothyroxine 50 mcg check TSH 4 weeks  2. Asthma, stable lungs are clear  3. MDD, still with positive depression screen. Will add Wellbutrin and refer to behavioral health for counseling. Patient is in agreement. Hopefully with making euthyroid this will also help his mood disorder  4. Hyperglycemia diet controlled  5. Pure hyperlipidemia, I favor this is secondary to his uncontrolled thyroid patient follow a low-fat low-cholesterol diet hopefully will be better when patient is euthyroid  6. Transaminitis/[place], LFTs are normal continue diet exercise weight loss  7. BMI 35.8 patient lost 5 pounds continue diet exercise weight loss  8. Recheck 6 weeks sooner if needed      1. Acquired hypothyroidism  Assessment & Plan:  TSH is 11.2 increase levothyroxine from 125 up to 150 check TSH in 4 weeks    Orders:  -     levothyroxine (Euthyrox) 150 mcg tablet; Take 1 tablet (150 mcg total) by mouth daily in the early morning  -     TSH, 3rd generation with Free T4 reflex; Future; Expected date: 2023    2. Moderate persistent asthma without complication  Assessment & Plan:  Stable, lungs are clear      3. Current moderate episode of major depressive disorder without prior episode [facility])  Assessment & Plan:  Symptoms not fully in remission will add Wellbutrin and refer to behavioral health for counseling    Orders:  -     Ambulatory referral to [address] [place] [address]; Future  -     buPROPion (WELLBUTRIN XL) 150 mg 24 hr tablet; Take 1 tablet (150 mg total) by mouth every morning    4. Hyperglycemia  Assessment & Plan:  Diet controlled      5. Hypercholesterolemia  Assessment & Plan:  Fat low-cholesterol diet recommended      6.  Severe obesity (BMI 35.0-39. 9) with comorbidity ([address])  Assessment & Plan:  MI 35.8 patient has lost 5 pounds continue diet exercise weight loss      7. Nonalcoholic steatohepatitis ([place])  Assessment & Plan:  LFTs normal continue to monitor diet weight loss to continue      8. Transaminitis  Assessment & Plan:  As above      9. Positive depression screening  -     Ambulatory referral to [facility]; Future           Subjective      Patient still feels he is mildly depressed negative HI/SI. Patient's Lexapro has helped but not fully resolved his symptoms. Patient is having some also grief reaction death of family members. Blood work was discussed with the patient    Review of Systems   Constitutional: Negative. HENT: Negative. Eyes: Negative. Respiratory: Negative. Cardiovascular: Negative. Gastrointestinal: Negative. Endocrine: Negative. Genitourinary: Negative. Musculoskeletal: Negative. Skin: Negative. Allergic/Immunologic: Negative. Neurological: Negative. Hematological: Negative. Psychiatric/Behavioral:        HPI       Current Outpatient Medications on File Prior to Visit   Medication Sig   • Albuterol Sulfate 108 (90 Base) MCG/ACT AEPB Inhale 2 sprays every 4 (four) hours as needed (wheeze)   • azelastine (ASTELIN) 0.1 % nasal spray 2 sprays into each nostril 2 (two) times a day   • cetirizine (ZyrTEC) 10 mg tablet Take 1 tablet by mouth daily   • escitalopram (LEXAPRO) 20 mg tablet Take 1 tablet (20 mg total) by mouth daily   • Fluticasone-Salmeterol (Advair) 500-50 mcg/dose inhaler Inhale 1 puff 2 (two) times a day Rinse mouth after use.    • montelukast (SINGULAIR) 10 mg tablet Take 1 tablet (10 mg total) by mouth daily   • [DISCONTINUED] levothyroxine 125 mcg tablet TAKE 1 TABLET (125 MCG TOTAL) BY MOUTH DAILY IN THE EARLY MORNING       Objective     /78   Pulse 69   Resp 18   Ht 6' (1.829 m)   Wt 120 kg (264 lb)   SpO2 97%   BMI 35.80 kg/m²     Physical Exam  Vitals and nursing note reviewed. Constitutional:       Appearance: Normal appearance. HENT:      Head: Normocephalic and atraumatic. Mouth/Throat:      Mouth: Mucous membranes are moist.   Eyes:      General: No scleral icterus. Neck:      Vascular: No carotid bruit. Cardiovascular:      Rate and Rhythm: Normal rate and regular rhythm. Heart sounds: Normal heart sounds. Pulmonary:      Effort: Pulmonary effort is normal.      Breath sounds: Normal breath sounds. Abdominal:      General: Bowel sounds are normal.      Palpations: Abdomen is soft. Tenderness: There is no abdominal tenderness. Musculoskeletal:      Cervical back: Neck supple. Right lower leg: No edema. Left lower leg: No edema. Skin:     General: Skin is warm and dry. Neurological:      General: No focal deficit present. Mental Status: He is alert. Psychiatric:         Mood and Affect: Mood normal.       Tito Ventura, DO  Depression Screening Follow-up Plan: Patient's depression screening was positive with a PHQ-2 score of . Their PHQ-9 score was 17. Patient advised to follow-up with PCP for further management.

## 2023-08-03 NOTE — ASSESSMENT & PLAN NOTE
As above Quality 402: Tobacco Use And Help With Quitting Among Adolescents: Patient screened for tobacco and never smoked Quality 130: Documentation Of Current Medications In The Medical Record: Current Medications Documented Quality 431: Preventive Care And Screening: Unhealthy Alcohol Use - Screening: Unhealthy alcohol use screening not performed, reason not otherwise specified Detail Level: Detailed

## 2023-08-26 DIAGNOSIS — F32.1 CURRENT MODERATE EPISODE OF MAJOR DEPRESSIVE DISORDER WITHOUT PRIOR EPISODE (HCC): ICD-10-CM

## 2023-08-28 RX ORDER — BUPROPION HYDROCHLORIDE 150 MG/1
150 TABLET ORAL EVERY MORNING
Qty: 90 TABLET | Refills: 1 | Status: SHIPPED | OUTPATIENT
Start: 2023-08-28

## 2023-09-18 ENCOUNTER — TELEPHONE (OUTPATIENT)
Dept: PSYCHIATRY | Facility: CLINIC | Age: 33
End: 2023-09-18

## 2023-09-18 NOTE — TELEPHONE ENCOUNTER
Left vm with patient to inform that we have received the referral to be scheduled with Wilfrid Jeronimo, advised patient we are working off a wait list and to call back with any questions or concerns

## 2023-10-06 DIAGNOSIS — E03.9 ACQUIRED HYPOTHYROIDISM: ICD-10-CM

## 2023-10-06 RX ORDER — LEVOTHYROXINE SODIUM 0.15 MG/1
150 TABLET ORAL
Qty: 90 TABLET | Refills: 2 | Status: SHIPPED | OUTPATIENT
Start: 2023-10-06

## 2023-12-21 DIAGNOSIS — F32.1 CURRENT MODERATE EPISODE OF MAJOR DEPRESSIVE DISORDER WITHOUT PRIOR EPISODE (HCC): ICD-10-CM

## 2023-12-21 RX ORDER — BUPROPION HYDROCHLORIDE 150 MG/1
150 TABLET ORAL EVERY MORNING
Qty: 90 TABLET | Refills: 0 | Status: SHIPPED | OUTPATIENT
Start: 2023-12-21

## 2024-02-05 ENCOUNTER — TELEPHONE (OUTPATIENT)
Dept: FAMILY MEDICINE CLINIC | Facility: CLINIC | Age: 34
End: 2024-02-05

## 2024-02-05 RX ORDER — CLONIDINE HYDROCHLORIDE 0.1 MG/1
0.1 TABLET ORAL
COMMUNITY
Start: 2024-01-23

## 2024-02-05 NOTE — TELEPHONE ENCOUNTER
----- Message from Chery Pop sent at 2/5/2024 10:14 AM EST -----  Regarding: FW: [place]  Contact: [phone]  [place] crenshaw      ----- Message -----  From: Francesca Ford  Sent: 2/5/2024   9:09 AM EST  To: [facility] Clinical  Subject: FW: Advair                                         ----- Message -----  From: Sim Carpio  Sent: 2/5/2024   8:41 AM EST  To: [facility] Clinical  Subject: Advair                                           Good morning. Advair is apparently no longer covered by my insurance and I am almost out of my current script. I have maybe a day left. [facility] said they would contact you for an alternative but I am not sure if they did. My insurance suggests Fluticasone Propionate/salmeterol as an alternative but I am ok with whatever you prescribe

## 2024-02-05 NOTE — TELEPHONE ENCOUNTER
Please have patient make appointment is canceled his last 2 appointments.  In addition he should complete his blood work for thyroid it was added in August.  We can discuss a new inhaler for him

## 2024-02-07 DIAGNOSIS — J45.41 MODERATE PERSISTENT ASTHMA WITH ACUTE EXACERBATION: ICD-10-CM

## 2024-02-07 NOTE — TELEPHONE ENCOUNTER
Patients wife called and stated patients insurance no longer covers the Advair inhaler. Patients wife stated they do cover an alternative Fluticasone Propionate/salmeterol. Patients wife would like this filled to the pharmacy if possible. Please advise

## 2024-02-08 ENCOUNTER — TELEPHONE (OUTPATIENT)
Dept: FAMILY MEDICINE CLINIC | Facility: CLINIC | Age: 34
End: 2024-02-08

## 2024-02-08 ENCOUNTER — TELEPHONE (OUTPATIENT)
Age: 34
End: 2024-02-08

## 2024-02-08 NOTE — TELEPHONE ENCOUNTER
Patient's wife called to say her  does not want to make another appt concerning thyroid follow up and does not understand why he needs to as all he needs at the moment is a refill on inhaler. She clarified that the only difference now is that the insurance no longer covers Advair so any brand will do. He is currently without any inhaler so she is very concerned. Please call her back with any information regarding medication.

## 2024-02-08 NOTE — TELEPHONE ENCOUNTER
Patient should complete his blood work that was ordered in August and make office visit.  In August he was to have a recheck in 6 weeks and patient has not been seen since that office visit

## 2024-02-08 NOTE — TELEPHONE ENCOUNTER
Pt called [employer] stating the advair is not covered and he cannot financially afford to keep coming in the office. He just needs an alt inhaler.

## 2024-02-08 NOTE — TELEPHONE ENCOUNTER
This patient refuses blood work refused follow-up.  I will not give him prescription until he makes office visit complete blood work

## 2024-02-08 NOTE — TELEPHONE ENCOUNTER
Patients wife called in and stated that the patient's insurance will no longer cover the Advair inhaler - Patients wife stated that the patient has non of the Advair left and they can't afford to keep coming in. Patient did have refills but again the insurance won't cover that brand Called over to the office  and talked with Iwona and advised her what was going on. Iwona will sent a message to Dr. Ventura regarding this issue/. Iwona will follow up with the patient once Dr. Ventura reply's back to the office. The call with the patient was disconnected.

## 2024-02-21 PROBLEM — Z00.00 HEALTH CARE MAINTENANCE: Status: RESOLVED | Noted: 2020-10-12 | Resolved: 2024-02-21

## 2024-02-24 ENCOUNTER — APPOINTMENT (OUTPATIENT)
Dept: LAB | Age: 34
End: 2024-02-24
Payer: COMMERCIAL

## 2024-02-24 DIAGNOSIS — E03.9 ACQUIRED HYPOTHYROIDISM: ICD-10-CM

## 2024-02-24 LAB
T4 FREE SERPL-MCNC: 0.86 NG/DL (ref 0.61–1.12)
TSH SERPL DL<=0.05 MIU/L-ACNC: 12.55 UIU/ML (ref 0.45–4.5)

## 2024-02-24 PROCEDURE — 84439 ASSAY OF FREE THYROXINE: CPT

## 2024-02-24 PROCEDURE — 36415 COLL VENOUS BLD VENIPUNCTURE: CPT

## 2024-02-24 PROCEDURE — 84443 ASSAY THYROID STIM HORMONE: CPT

## 2024-02-28 ENCOUNTER — OFFICE VISIT (OUTPATIENT)
Dept: FAMILY MEDICINE CLINIC | Facility: CLINIC | Age: 34
End: 2024-02-28
Payer: COMMERCIAL

## 2024-02-28 VITALS
HEART RATE: 88 BPM | BODY MASS INDEX: 36.44 KG/M2 | SYSTOLIC BLOOD PRESSURE: 126 MMHG | OXYGEN SATURATION: 95 % | HEIGHT: 72 IN | WEIGHT: 269 LBS | DIASTOLIC BLOOD PRESSURE: 80 MMHG

## 2024-02-28 DIAGNOSIS — J30.9 ALLERGIC RHINITIS, UNSPECIFIED SEASONALITY, UNSPECIFIED TRIGGER: ICD-10-CM

## 2024-02-28 DIAGNOSIS — K75.81 NONALCOHOLIC STEATOHEPATITIS (NASH): ICD-10-CM

## 2024-02-28 DIAGNOSIS — F32.1 CURRENT MODERATE EPISODE OF MAJOR DEPRESSIVE DISORDER WITHOUT PRIOR EPISODE (HCC): ICD-10-CM

## 2024-02-28 DIAGNOSIS — E66.01 SEVERE OBESITY (BMI 35.0-39.9) WITH COMORBIDITY (HCC): ICD-10-CM

## 2024-02-28 DIAGNOSIS — H69.90 DYSFUNCTION OF EUSTACHIAN TUBE, UNSPECIFIED LATERALITY: ICD-10-CM

## 2024-02-28 DIAGNOSIS — J45.40 MODERATE PERSISTENT ASTHMA WITHOUT COMPLICATION: ICD-10-CM

## 2024-02-28 DIAGNOSIS — R74.01 TRANSAMINITIS: ICD-10-CM

## 2024-02-28 DIAGNOSIS — E03.9 ACQUIRED HYPOTHYROIDISM: Primary | ICD-10-CM

## 2024-02-28 DIAGNOSIS — E78.00 HYPERCHOLESTEROLEMIA: ICD-10-CM

## 2024-02-28 DIAGNOSIS — R73.9 HYPERGLYCEMIA: ICD-10-CM

## 2024-02-28 PROCEDURE — 99214 OFFICE O/P EST MOD 30 MIN: CPT | Performed by: FAMILY MEDICINE

## 2024-02-28 RX ORDER — FLUTICASONE PROPIONATE AND SALMETEROL 500; 50 UG/1; UG/1
1 POWDER RESPIRATORY (INHALATION) 2 TIMES DAILY
Qty: 60 BLISTER | Refills: 5 | Status: SHIPPED | OUTPATIENT
Start: 2024-02-28 | End: 2024-08-26

## 2024-02-28 RX ORDER — LEVOTHYROXINE SODIUM 175 UG/1
175 TABLET ORAL
Qty: 30 TABLET | Refills: 5 | Status: SHIPPED | OUTPATIENT
Start: 2024-02-28

## 2024-02-28 NOTE — PROGRESS NOTES
Name: Sim Carpio      : 1990      MRN: [id number]  Encounter Provider: Tito Ventura DO  Encounter Date: 2024   Encounter department: [facility]    Assessment & Plan     1.  Hypothyroidism with TSH of 12 increase levothyroxine from 150 up to 175 daily  2.,  Stable will change to generic Advair as this should covered by his insurance  3.  Pure allergic rhinitis/eustachian tube dysfunction, stable continue present therapy  4.  HOLLINGSWORTH/transaminitis blood work ordered  5.  MDD, in remission on Wellbutrin and Lexapro  6.  Hyperglycemia blood work ordered  7.  Hyperlipidemia, low-fat low-cholesterol diet recommended blood work ordered 8.  Patient to return in 3 months for office visit and blood work sooner if needed        1. Acquired hypothyroidism  Assessment & Plan:  Discontinue levothyroxine 150 mcg changed to 175 mcg daily    Orders:  -     levothyroxine (Euthyrox) 175 mcg tablet; Take 1 tablet (175 mcg total) by mouth daily in the early morning  -     CBC; Future; Expected date: 2024  -     Comprehensive metabolic panel; Future; Expected date: 2024  -     Hemoglobin A1C; Future; Expected date: 2024  -     Lipid Panel with Direct LDL reflex; Future; Expected date: 2024  -     TSH, 3rd generation with Free T4 reflex; Future; Expected date: 2024  -     UA (URINE) with reflex to Scope; Future; Expected date: 2024    2. Moderate persistent asthma without complication  Assessment & Plan:  Stable, lungs are clear will change to generic Advair    Orders:  -     Fluticasone-Salmeterol (Advair) 500-50 mcg/dose inhaler; Inhale 1 puff 2 (two) times a day Rinse mouth after use.  -     CBC; Future; Expected date: 2024  -     Comprehensive metabolic panel; Future; Expected date: 2024  -     Hemoglobin A1C; Future; Expected date: 2024  -     Lipid Panel with Direct LDL reflex; Future; Expected date: 2024  -     TSH, 3rd generation  with Free T4 reflex; Future; Expected date: 05/28/2024  -     UA (URINE) with reflex to Scope; Future; Expected date: 05/28/2024    3. Allergic rhinitis, unspecified seasonality, unspecified trigger  Assessment & Plan:  Stable continue Singulair and Astelin    Orders:  -     CBC; Future; Expected date: 05/28/2024  -     Comprehensive metabolic panel; Future; Expected date: 05/28/2024  -     Hemoglobin A1C; Future; Expected date: 05/28/2024  -     Lipid Panel with Direct LDL reflex; Future; Expected date: 05/28/2024  -     TSH, 3rd generation with Free T4 reflex; Future; Expected date: 05/28/2024  -     UA (URINE) with reflex to Scope; Future; Expected date: 05/28/2024    4. Current moderate episode of major depressive disorder without prior episode (HCC)  Assessment & Plan:  In remission on Wellbutrin and Lexapro    Orders:  -     CBC; Future; Expected date: 05/28/2024  -     Comprehensive metabolic panel; Future; Expected date: 05/28/2024  -     Hemoglobin A1C; Future; Expected date: 05/28/2024  -     Lipid Panel with Direct LDL reflex; Future; Expected date: 05/28/2024  -     TSH, 3rd generation with Free T4 reflex; Future; Expected date: 05/28/2024  -     UA (URINE) with reflex to Scope; Future; Expected date: 05/28/2024    5. Dysfunction of Eustachian tube, unspecified laterality  Assessment & Plan:  As above    Orders:  -     CBC; Future; Expected date: 05/28/2024  -     Comprehensive metabolic panel; Future; Expected date: 05/28/2024  -     Hemoglobin A1C; Future; Expected date: 05/28/2024  -     Lipid Panel with Direct LDL reflex; Future; Expected date: 05/28/2024  -     TSH, 3rd generation with Free T4 reflex; Future; Expected date: 05/28/2024  -     UA (URINE) with reflex to Scope; Future; Expected date: 05/28/2024    6. Hypercholesterolemia  Assessment & Plan:  Blood work ordered, low fat diet recommended    Orders:  -     CBC; Future; Expected date: 05/28/2024  -     Comprehensive metabolic panel; Future;  Expected date: 05/28/2024  -     Hemoglobin A1C; Future; Expected date: 05/28/2024  -     Lipid Panel with Direct LDL reflex; Future; Expected date: 05/28/2024  -     TSH, 3rd generation with Free T4 reflex; Future; Expected date: 05/28/2024  -     UA (URINE) with reflex to Scope; Future; Expected date: 05/28/2024    7. Hyperglycemia  Assessment & Plan:  Blood work is ordered    Orders:  -     CBC; Future; Expected date: 05/28/2024  -     Comprehensive metabolic panel; Future; Expected date: 05/28/2024  -     Hemoglobin A1C; Future; Expected date: 05/28/2024  -     Lipid Panel with Direct LDL reflex; Future; Expected date: 05/28/2024  -     TSH, 3rd generation with Free T4 reflex; Future; Expected date: 05/28/2024  -     UA (URINE) with reflex to Scope; Future; Expected date: 05/28/2024    8. Severe obesity (BMI 35.0-39.9) with comorbidity (HCC)  Assessment & Plan:  BMI 36.48 patient gained 5 pounds diet exercise weight loss recommended hopefully if we help patient become euthyroid this will help with weight loss    Orders:  -     CBC; Future; Expected date: 05/28/2024  -     Comprehensive metabolic panel; Future; Expected date: 05/28/2024  -     Hemoglobin A1C; Future; Expected date: 05/28/2024  -     Lipid Panel with Direct LDL reflex; Future; Expected date: 05/28/2024  -     TSH, 3rd generation with Free T4 reflex; Future; Expected date: 05/28/2024  -     UA (URINE) with reflex to Scope; Future; Expected date: 05/28/2024    9. Transaminitis  Assessment & Plan:  Blood work ordered    Orders:  -     CBC; Future; Expected date: 05/28/2024  -     Comprehensive metabolic panel; Future; Expected date: 05/28/2024  -     Hemoglobin A1C; Future; Expected date: 05/28/2024  -     Lipid Panel with Direct LDL reflex; Future; Expected date: 05/28/2024  -     TSH, 3rd generation with Free T4 reflex; Future; Expected date: 05/28/2024  -     UA (URINE) with reflex to Scope; Future; Expected date: 05/28/2024    10. Nonalcoholic  steatohepatitis (HOLLINGSWORTH)  Assessment & Plan:  Blood work is ordered    Orders:  -     CBC; Future; Expected date: 05/28/2024  -     Comprehensive metabolic panel; Future; Expected date: 05/28/2024  -     Hemoglobin A1C; Future; Expected date: 05/28/2024  -     Lipid Panel with Direct LDL reflex; Future; Expected date: 05/28/2024  -     TSH, 3rd generation with Free T4 reflex; Future; Expected date: 05/28/2024  -     UA (URINE) with reflex to Scope; Future; Expected date: 05/28/2024        Depression Screening and Follow-up Plan: Patient was screened for depression during today's encounter. They screened negative with a PHQ-9 score of 0.        Subjective      Patient is here for follow-up again 5 pounds blood work was discussed.  Patient's Advair is no longer covered however they will cover the generic we will change him to that.      Review of Systems   Constitutional: Negative.    HENT: Negative.     Eyes: Negative.    Respiratory:          HPI   Cardiovascular: Negative.    Gastrointestinal: Negative.    Endocrine:        Still with elevated TSH but at least T4 is now normal   Genitourinary: Negative.    Musculoskeletal: Negative.    Skin: Negative.    Allergic/Immunologic: Negative.    Neurological: Negative.    Hematological: Negative.    Psychiatric/Behavioral: Negative.         Current Outpatient Medications on File Prior to Visit   Medication Sig   • Albuterol Sulfate 108 (90 Base) MCG/ACT AEPB Inhale 2 sprays every 4 (four) hours as needed (wheeze)   • azelastine (ASTELIN) 0.1 % nasal spray 2 sprays into each nostril 2 (two) times a day   • buPROPion (WELLBUTRIN XL) 150 mg 24 hr tablet TAKE 1 TABLET BY MOUTH EVERY DAY IN THE MORNING   • cetirizine (ZyrTEC) 10 mg tablet Take 1 tablet by mouth daily   • cloNIDine (CATAPRES) 0.1 mg tablet Take 0.1 mg by mouth daily at bedtime   • escitalopram (LEXAPRO) 20 mg tablet Take 1 tablet (20 mg total) by mouth daily   • montelukast (SINGULAIR) 10 mg tablet Take 1 tablet  (10 mg total) by mouth daily   • [DISCONTINUED] Fluticasone-Salmeterol (Advair) 500-50 mcg/dose inhaler Inhale 1 puff 2 (two) times a day Rinse mouth after use.   • [DISCONTINUED] levothyroxine 150 mcg tablet TAKE 1 TABLET (150 MCG TOTAL) BY MOUTH DAILY IN THE EARLY MORNING       Objective     /80 (BP Location: Right arm, Patient Position: Sitting, Cuff Size: Standard)   Pulse 88   Ht 6' (1.829 m)   Wt 122 kg (269 lb)   SpO2 95%   BMI 36.48 kg/m²     Physical Exam  Vitals and nursing note reviewed.   Constitutional:       Appearance: Normal appearance. He is obese.   HENT:      Head: Normocephalic and atraumatic.      Mouth/Throat:      Mouth: Mucous membranes are moist.   Eyes:      General: No scleral icterus.  Neck:      Vascular: No carotid bruit.   Cardiovascular:      Rate and Rhythm: Normal rate and regular rhythm.      Heart sounds: Normal heart sounds.   Pulmonary:      Effort: Pulmonary effort is normal.      Breath sounds: Normal breath sounds.   Abdominal:      General: Bowel sounds are normal.      Palpations: Abdomen is soft.      Tenderness: There is no abdominal tenderness.   Musculoskeletal:      Cervical back: Neck supple.      Right lower leg: No edema.      Left lower leg: No edema.   Skin:     General: Skin is warm and dry.   Neurological:      General: No focal deficit present.      Mental Status: He is alert.   Psychiatric:         Mood and Affect: Mood normal.       Tito Ventura DO

## 2024-02-28 NOTE — ASSESSMENT & PLAN NOTE
BMI 36.48 patient gained 5 pounds diet exercise weight loss recommended hopefully if we help patient become euthyroid this will help with weight loss

## 2024-02-28 NOTE — PATIENT INSTRUCTIONS
Discontinue levothyroxine 150 mcg  Start levothyroxine 175 mcg daily  Continue Advair, I sent in generic which should be covered  Exercise weight loss recommended  Continue all other medications  Return in 3 months for office of blood work sooner if needed

## 2024-08-01 ENCOUNTER — APPOINTMENT (OUTPATIENT)
Dept: LAB | Age: 34
End: 2024-08-01
Payer: COMMERCIAL

## 2024-08-01 DIAGNOSIS — E78.00 HYPERCHOLESTEROLEMIA: ICD-10-CM

## 2024-08-01 DIAGNOSIS — R73.9 HYPERGLYCEMIA: ICD-10-CM

## 2024-08-01 DIAGNOSIS — K75.81 NONALCOHOLIC STEATOHEPATITIS (NASH): ICD-10-CM

## 2024-08-01 DIAGNOSIS — H69.90 DYSFUNCTION OF EUSTACHIAN TUBE, UNSPECIFIED LATERALITY: ICD-10-CM

## 2024-08-01 DIAGNOSIS — E03.9 ACQUIRED HYPOTHYROIDISM: ICD-10-CM

## 2024-08-01 DIAGNOSIS — F32.1 CURRENT MODERATE EPISODE OF MAJOR DEPRESSIVE DISORDER WITHOUT PRIOR EPISODE (HCC): ICD-10-CM

## 2024-08-01 DIAGNOSIS — R74.01 TRANSAMINITIS: ICD-10-CM

## 2024-08-01 DIAGNOSIS — J30.9 ALLERGIC RHINITIS, UNSPECIFIED SEASONALITY, UNSPECIFIED TRIGGER: ICD-10-CM

## 2024-08-01 DIAGNOSIS — E66.01 SEVERE OBESITY (BMI 35.0-39.9) WITH COMORBIDITY (HCC): ICD-10-CM

## 2024-08-01 DIAGNOSIS — J45.40 MODERATE PERSISTENT ASTHMA WITHOUT COMPLICATION: ICD-10-CM

## 2024-08-01 LAB
ALBUMIN SERPL BCG-MCNC: 4.4 G/DL (ref 3.5–5)
ALP SERPL-CCNC: 76 U/L (ref 34–104)
ALT SERPL W P-5'-P-CCNC: 21 U/L (ref 7–52)
ANION GAP SERPL CALCULATED.3IONS-SCNC: 7 MMOL/L (ref 4–13)
AST SERPL W P-5'-P-CCNC: 19 U/L (ref 13–39)
BILIRUB SERPL-MCNC: 0.66 MG/DL (ref 0.2–1)
BUN SERPL-MCNC: 14 MG/DL (ref 5–25)
CALCIUM SERPL-MCNC: 9.5 MG/DL (ref 8.4–10.2)
CHLORIDE SERPL-SCNC: 106 MMOL/L (ref 96–108)
CHOLEST SERPL-MCNC: 192 MG/DL
CO2 SERPL-SCNC: 28 MMOL/L (ref 21–32)
CREAT SERPL-MCNC: 0.84 MG/DL (ref 0.6–1.3)
ERYTHROCYTE [DISTWIDTH] IN BLOOD BY AUTOMATED COUNT: 12.2 % (ref 11.6–15.1)
EST. AVERAGE GLUCOSE BLD GHB EST-MCNC: 105 MG/DL
GFR SERPL CREATININE-BSD FRML MDRD: 113 ML/MIN/1.73SQ M
GLUCOSE P FAST SERPL-MCNC: 94 MG/DL (ref 65–99)
HBA1C MFR BLD: 5.3 %
HCT VFR BLD AUTO: 45.2 % (ref 36.5–49.3)
HDLC SERPL-MCNC: 59 MG/DL
HGB BLD-MCNC: 15.3 G/DL (ref 12–17)
LDLC SERPL CALC-MCNC: 105 MG/DL (ref 0–100)
MCH RBC QN AUTO: 33.5 PG (ref 26.8–34.3)
MCHC RBC AUTO-ENTMCNC: 33.8 G/DL (ref 31.4–37.4)
MCV RBC AUTO: 99 FL (ref 82–98)
PLATELET # BLD AUTO: 248 THOUSANDS/UL (ref 149–390)
PMV BLD AUTO: 10.2 FL (ref 8.9–12.7)
POTASSIUM SERPL-SCNC: 4.4 MMOL/L (ref 3.5–5.3)
PROT SERPL-MCNC: 6.8 G/DL (ref 6.4–8.4)
RBC # BLD AUTO: 4.57 MILLION/UL (ref 3.88–5.62)
SODIUM SERPL-SCNC: 141 MMOL/L (ref 135–147)
T4 FREE SERPL-MCNC: 0.84 NG/DL (ref 0.61–1.12)
TRIGL SERPL-MCNC: 141 MG/DL
TSH SERPL DL<=0.05 MIU/L-ACNC: 0.4 UIU/ML (ref 0.45–4.5)
WBC # BLD AUTO: 5.53 THOUSAND/UL (ref 4.31–10.16)

## 2024-08-01 PROCEDURE — 80061 LIPID PANEL: CPT

## 2024-08-01 PROCEDURE — 80053 COMPREHEN METABOLIC PANEL: CPT

## 2024-08-01 PROCEDURE — 36415 COLL VENOUS BLD VENIPUNCTURE: CPT

## 2024-08-01 PROCEDURE — 83036 HEMOGLOBIN GLYCOSYLATED A1C: CPT

## 2024-08-01 PROCEDURE — 85027 COMPLETE CBC AUTOMATED: CPT

## 2024-08-01 PROCEDURE — 84443 ASSAY THYROID STIM HORMONE: CPT

## 2024-08-01 PROCEDURE — 84439 ASSAY OF FREE THYROXINE: CPT

## 2024-08-02 ENCOUNTER — OFFICE VISIT (OUTPATIENT)
Dept: FAMILY MEDICINE CLINIC | Facility: CLINIC | Age: 34
End: 2024-08-02
Payer: COMMERCIAL

## 2024-08-02 VITALS
HEIGHT: 72 IN | DIASTOLIC BLOOD PRESSURE: 84 MMHG | TEMPERATURE: 97 F | OXYGEN SATURATION: 98 % | SYSTOLIC BLOOD PRESSURE: 124 MMHG | BODY MASS INDEX: 33.97 KG/M2 | WEIGHT: 250.8 LBS | HEART RATE: 86 BPM

## 2024-08-02 DIAGNOSIS — E03.9 ACQUIRED HYPOTHYROIDISM: ICD-10-CM

## 2024-08-02 DIAGNOSIS — F32.1 CURRENT MODERATE EPISODE OF MAJOR DEPRESSIVE DISORDER WITHOUT PRIOR EPISODE (HCC): ICD-10-CM

## 2024-08-02 DIAGNOSIS — J45.40 MODERATE PERSISTENT ASTHMA WITHOUT COMPLICATION: Primary | ICD-10-CM

## 2024-08-02 DIAGNOSIS — E78.00 HYPERCHOLESTEROLEMIA: ICD-10-CM

## 2024-08-02 DIAGNOSIS — R73.9 HYPERGLYCEMIA: ICD-10-CM

## 2024-08-02 DIAGNOSIS — K75.81 NONALCOHOLIC STEATOHEPATITIS (NASH): ICD-10-CM

## 2024-08-02 DIAGNOSIS — J30.9 ALLERGIC RHINITIS, UNSPECIFIED SEASONALITY, UNSPECIFIED TRIGGER: ICD-10-CM

## 2024-08-02 DIAGNOSIS — R74.01 TRANSAMINITIS: ICD-10-CM

## 2024-08-02 PROBLEM — E66.01 SEVERE OBESITY (BMI 35.0-39.9) WITH COMORBIDITY (HCC): Status: RESOLVED | Noted: 2023-01-05 | Resolved: 2024-08-02

## 2024-08-02 PROCEDURE — 99214 OFFICE O/P EST MOD 30 MIN: CPT | Performed by: FAMILY MEDICINE

## 2024-08-02 NOTE — PROGRESS NOTES
Ambulatory Visit  Name: Sim Carpio      : 1990      MRN: [id number]  Encounter Provider: Tito Ventura DO  Encounter Date: 2024   Encounter department: [facility]    1.  Asthma/allergic rhinitis, stable lungs are clear continue present therapy  2.  Hypothyroidism, stable continue present therapy #3.  HOLLINGSWORTH/transaminitis LFTs are normal continue to monitor  4.  Hypercholesterolemia diet controlled  5.  Hyperglycemia diet controlled  6.  MDD, in remission continue present therapy  7.  BMI 34.01 patient lost 19 pounds inaccurate diagnosis secondary to muscular body habitus  8.  Return in 6 months for office visit and blood work sooner if needed.    Assessment & Plan   1. Moderate persistent asthma without complication  Assessment & Plan:  Stable, lungs are clear  Orders:  -     CBC; Future; Expected date: 2025  -     Comprehensive metabolic panel; Future; Expected date: 2025  -     Hemoglobin A1C; Future; Expected date: 2025  -     Lipid Panel with Direct LDL reflex; Future; Expected date: 2025  -     TSH, 3rd generation with Free T4 reflex; Future; Expected date: 2025  -     UA (URINE) with reflex to Scope; Future; Expected date: 2025  2. Allergic rhinitis, unspecified seasonality, unspecified trigger  Assessment & Plan:  Stable continue present therapy  Orders:  -     CBC; Future; Expected date: 2025  -     Comprehensive metabolic panel; Future; Expected date: 2025  -     Hemoglobin A1C; Future; Expected date: 2025  -     Lipid Panel with Direct LDL reflex; Future; Expected date: 2025  -     TSH, 3rd generation with Free T4 reflex; Future; Expected date: 2025  -     UA (URINE) with reflex to Scope; Future; Expected date: 2025  3. Nonalcoholic steatohepatitis (HOLLINGSWORTH)  Assessment & Plan:  LFTs are normal continue to monitor    Orders:  -     CBC; Future; Expected date: 2025  -     Comprehensive  metabolic panel; Future; Expected date: 02/02/2025  -     Hemoglobin A1C; Future; Expected date: 02/02/2025  -     Lipid Panel with Direct LDL reflex; Future; Expected date: 02/02/2025  -     TSH, 3rd generation with Free T4 reflex; Future; Expected date: 02/02/2025  -     UA (URINE) with reflex to Scope; Future; Expected date: 02/02/2025  4. Acquired hypothyroidism  Assessment & Plan:  Free T4 is normal TSH is borderline we will continue levothyroxine 175 mcg and monitor  Orders:  -     CBC; Future; Expected date: 02/02/2025  -     Comprehensive metabolic panel; Future; Expected date: 02/02/2025  -     Hemoglobin A1C; Future; Expected date: 02/02/2025  -     Lipid Panel with Direct LDL reflex; Future; Expected date: 02/02/2025  -     TSH, 3rd generation with Free T4 reflex; Future; Expected date: 02/02/2025  -     UA (URINE) with reflex to Scope; Future; Expected date: 02/02/2025  5. Current moderate episode of major depressive disorder without prior episode (HCC)  Assessment & Plan:  In remission continue Wellbutrin and Lexapro  Orders:  -     CBC; Future; Expected date: 02/02/2025  -     Comprehensive metabolic panel; Future; Expected date: 02/02/2025  -     Hemoglobin A1C; Future; Expected date: 02/02/2025  -     Lipid Panel with Direct LDL reflex; Future; Expected date: 02/02/2025  -     TSH, 3rd generation with Free T4 reflex; Future; Expected date: 02/02/2025  -     UA (URINE) with reflex to Scope; Future; Expected date: 02/02/2025  6. Hypercholesterolemia  Assessment & Plan:  This is now diet controlled will monitor  Orders:  -     CBC; Future; Expected date: 02/02/2025  -     Comprehensive metabolic panel; Future; Expected date: 02/02/2025  -     Hemoglobin A1C; Future; Expected date: 02/02/2025  -     Lipid Panel with Direct LDL reflex; Future; Expected date: 02/02/2025  -     TSH, 3rd generation with Free T4 reflex; Future; Expected date: 02/02/2025  -     UA (URINE) with reflex to Scope; Future; Expected  date: 02/02/2025  7. Hyperglycemia  Assessment & Plan:  Diet controlled  Orders:  -     CBC; Future; Expected date: 02/02/2025  -     Comprehensive metabolic panel; Future; Expected date: 02/02/2025  -     Hemoglobin A1C; Future; Expected date: 02/02/2025  -     Lipid Panel with Direct LDL reflex; Future; Expected date: 02/02/2025  -     TSH, 3rd generation with Free T4 reflex; Future; Expected date: 02/02/2025  -     UA (URINE) with reflex to Scope; Future; Expected date: 02/02/2025  8. Transaminitis  Assessment & Plan:  LFTs are normal continue to monitor her  Orders:  -     CBC; Future; Expected date: 02/02/2025  -     Comprehensive metabolic panel; Future; Expected date: 02/02/2025  -     Hemoglobin A1C; Future; Expected date: 02/02/2025  -     Lipid Panel with Direct LDL reflex; Future; Expected date: 02/02/2025  -     TSH, 3rd generation with Free T4 reflex; Future; Expected date: 02/02/2025  -     UA (URINE) with reflex to Scope; Future; Expected date: 02/02/2025       History of Present Illness     Patient doing well without [facility] complaints or concerns at the present time.  Blood work was reviewed        Review of Systems   Constitutional: Negative.    HENT: Negative.     Eyes: Negative.    Respiratory: Negative.     Cardiovascular: Negative.    Gastrointestinal: Negative.    Endocrine: Negative.    Genitourinary: Negative.    Musculoskeletal: Negative.    Skin: Negative.    Allergic/Immunologic: Negative.    Neurological: Negative.    Hematological: Negative.    Psychiatric/Behavioral: Negative.         Objective     /84 (BP Location: Right arm, Patient Position: Sitting, Cuff Size: Adult)   Pulse 86   Temp (!) 97 °F (36.1 °C) (Temporal)   Ht 6' (1.829 m)   Wt 114 kg (250 lb 12.8 oz)   SpO2 98%   BMI 34.01 kg/m²     Physical Exam  Vitals and nursing note reviewed.   Constitutional:       Comments: Muscular 34-year-old white male   HENT:      Head: Normocephalic and atraumatic.       Mouth/Throat:      Mouth: Mucous membranes are moist.   Eyes:      General: No scleral icterus.  Neck:      Vascular: No carotid bruit.   Cardiovascular:      Rate and Rhythm: Normal rate and regular rhythm.      Heart sounds: Normal heart sounds.   Pulmonary:      Effort: Pulmonary effort is normal.      Breath sounds: Normal breath sounds.   Abdominal:      General: Bowel sounds are normal.      Palpations: Abdomen is soft.      Tenderness: There is no abdominal tenderness.   Musculoskeletal:      Cervical back: Neck supple.      Right lower leg: No edema.      Left lower leg: No edema.   Skin:     General: Skin is warm and dry.   Neurological:      General: No focal deficit present.   Psychiatric:         Mood and Affect: Mood normal.       Administrative Statements

## 2024-08-02 NOTE — PATIENT INSTRUCTIONS
Continue with low sugar low carbohydrate low-fat diet  Continue present therapy  Return in 6 months for office visit blood work sooner if needed

## 2024-10-11 DIAGNOSIS — E03.9 ACQUIRED HYPOTHYROIDISM: ICD-10-CM

## 2024-10-11 RX ORDER — LEVOTHYROXINE SODIUM 175 UG/1
175 TABLET ORAL
Qty: 30 TABLET | Refills: 5 | Status: SHIPPED | OUTPATIENT
Start: 2024-10-11

## 2024-10-15 DIAGNOSIS — J45.40 MODERATE PERSISTENT ASTHMA WITHOUT COMPLICATION: ICD-10-CM

## 2024-10-16 RX ORDER — FLUTICASONE PROPIONATE AND SALMETEROL 500; 50 UG/1; UG/1
1 POWDER RESPIRATORY (INHALATION) 2 TIMES DAILY
Qty: 60 BLISTER | Refills: 5 | Status: SHIPPED | OUTPATIENT
Start: 2024-10-16 | End: 2025-04-14

## 2024-10-18 ENCOUNTER — APPOINTMENT (OUTPATIENT)
Dept: RADIOLOGY | Age: 34
End: 2024-10-18
Payer: COMMERCIAL

## 2024-10-18 ENCOUNTER — OFFICE VISIT (OUTPATIENT)
Dept: FAMILY MEDICINE CLINIC | Facility: CLINIC | Age: 34
End: 2024-10-18
Payer: COMMERCIAL

## 2024-10-18 VITALS
DIASTOLIC BLOOD PRESSURE: 80 MMHG | HEIGHT: 72 IN | RESPIRATION RATE: 18 BRPM | WEIGHT: 254 LBS | BODY MASS INDEX: 34.4 KG/M2 | SYSTOLIC BLOOD PRESSURE: 136 MMHG | OXYGEN SATURATION: 97 % | HEART RATE: 88 BPM

## 2024-10-18 DIAGNOSIS — M54.2 NECK PAIN: Primary | ICD-10-CM

## 2024-10-18 DIAGNOSIS — M54.12 CERVICAL RADICULITIS: ICD-10-CM

## 2024-10-18 DIAGNOSIS — M62.838 MUSCLE SPASMS OF NECK: ICD-10-CM

## 2024-10-18 DIAGNOSIS — M79.602 LEFT ARM PAIN: ICD-10-CM

## 2024-10-18 DIAGNOSIS — M25.512 ACUTE PAIN OF LEFT SHOULDER: ICD-10-CM

## 2024-10-18 DIAGNOSIS — M54.2 NECK PAIN: ICD-10-CM

## 2024-10-18 DIAGNOSIS — R29.898 DECREASED RANGE OF MOTION OF NECK: ICD-10-CM

## 2024-10-18 PROCEDURE — 99214 OFFICE O/P EST MOD 30 MIN: CPT | Performed by: FAMILY MEDICINE

## 2024-10-18 PROCEDURE — 72050 X-RAY EXAM NECK SPINE 4/5VWS: CPT

## 2024-10-18 RX ORDER — NAPROXEN 500 MG/1
500 TABLET ORAL 2 TIMES DAILY WITH MEALS
Qty: 30 TABLET | Refills: 1 | Status: SHIPPED | OUTPATIENT
Start: 2024-10-18

## 2024-10-18 NOTE — PATIENT INSTRUCTIONS
Start Naprosyn 500 mg twice daily breakfast and supper.  Start tizanidine 4 mg at bedtime.  Do not drive or operate machinery on this medication as it may cause drowsiness  Complete x-ray of neck  Follow-up physical therapy as ordered  Recheck 2 weeks sooner if needed

## 2024-10-18 NOTE — PROGRESS NOTES
Ambulatory Visit  Name: Sim Carpio      : 1990      MRN: [id number]  Encounter Provider: Tito Ventura DO  Encounter Date: 10/18/2024   Encounter department: [facility]  #1.  Neck pain, left-sided #2.  Cervical radiculitis, left-sided #3 PRID acute pain left shoulder  4.  Left arm pain  5.  Mild spasm of neck  6.  Decreased range of motion in neck  Naprosyn ordered GI side effects discussed  Tizanidine 4 mg at bedtime ordered drowsiness discussed  Cervical spine x-ray ordered  Refer to physical therapy  7.  Recheck 2 weeks sooner if needed  Assessment & Plan  Neck pain  As ordered below  Orders:    XR spine cervical complete 4 or 5 vw non injury; Future    Ambulatory referral to Physical Therapy; Future    naproxen (NAPROSYN) 500 mg tablet; Take 1 tablet (500 mg total) by mouth 2 (two) times a day with meals    tiZANidine (ZANAFLEX) 4 mg tablet; Take 1 tablet (4 mg total) by mouth daily at bedtime    Cervical radiculitis  As ordered below  Orders:    XR spine cervical complete 4 or 5 vw non injury; Future    Ambulatory referral to Physical Therapy; Future    naproxen (NAPROSYN) 500 mg tablet; Take 1 tablet (500 mg total) by mouth 2 (two) times a day with meals    Acute pain of left shoulder    Orders:    Ambulatory referral to Physical Therapy; Future    naproxen (NAPROSYN) 500 mg tablet; Take 1 tablet (500 mg total) by mouth 2 (two) times a day with meals    Left arm pain  As ordered below  Orders:    Ambulatory referral to Physical Therapy; Future    naproxen (NAPROSYN) 500 mg tablet; Take 1 tablet (500 mg total) by mouth 2 (two) times a day with meals    Muscle spasms of neck  As ordered below  Orders:    XR spine cervical complete 4 or 5 vw non injury; Future    Ambulatory referral to Physical Therapy; Future    tiZANidine (ZANAFLEX) 4 mg tablet; Take 1 tablet (4 mg total) by mouth daily at bedtime    Decreased range of motion of neck  As ordered below  Orders:    XR spine  cervical complete 4 or 5 vw non injury; Future    Ambulatory referral to Physical Therapy; Future      Depression Screening and Follow-up Plan: Patient was screened for depression during today's encounter. They screened negative with a PHQ-9 score of 0.      History of Present Illness     For the last 3 weeks or so patient has had neck pain left side rating to her shoulder and arm.  Does have some mild paresthesias no weakness.  Patient [place] any history of antecedent trauma.  No medication has been taken          Review of Systems   Constitutional: Negative.    HENT: Negative.     Eyes: Negative.    Respiratory: Negative.     Cardiovascular: Negative.    Gastrointestinal: Negative.    Endocrine: Negative.    Genitourinary: Negative.    Musculoskeletal:         HPI   Skin: Negative.    Allergic/Immunologic: Negative.    Neurological:         HPI   Hematological: Negative.    Psychiatric/Behavioral: Negative.             Objective     /80 (BP Location: Left arm, Patient Position: Sitting, Cuff Size: Large)   Pulse 88   Resp 18   Ht 6' (1.829 m)   Wt 115 kg (254 lb)   SpO2 97%   BMI 34.45 kg/m²     Physical Exam  Vitals and nursing note reviewed.   Constitutional:       Appearance: Normal appearance.   HENT:      Head: Normocephalic and atraumatic.      Mouth/Throat:      Mouth: Mucous membranes are moist.   Eyes:      General: No scleral icterus.  Neck:      Comments: Decreased range of motion cervical spine especially with right rotation and right sidebending  Cardiovascular:      Rate and Rhythm: Normal rate and regular rhythm.      Heart sounds: Normal heart sounds.   Pulmonary:      Effort: Pulmonary effort is normal.      Breath sounds: Normal breath sounds.   Musculoskeletal:         General: Tenderness present. No deformity.      Cervical back: Neck supple.      Comments: Positive paravertebral muscle contraction cervical upper thoracic spine left side more so than right side positive mild spasm  left trapezius musculature with point tenderness  Left shoulder elbow and wrist with full range of motion no tenderness no deformity  Left wrist no Tinel's or Phalen sign   Skin:     General: Skin is warm and dry.   Neurological:      General: No focal deficit present.      Mental Status: He is alert.      Sensory: No sensory deficit.      Motor: No weakness.   Psychiatric:         Mood and Affect: Mood normal.

## 2024-10-21 DIAGNOSIS — M48.02 CERVICAL STENOSIS OF SPINAL CANAL: Primary | ICD-10-CM

## 2024-10-21 PROBLEM — Q76.49 CONGENITAL CERVICAL SPINE STENOSIS: Status: RESOLVED | Noted: 2024-10-21 | Resolved: 2024-10-21

## 2024-10-21 PROBLEM — Q76.49 CONGENITAL CERVICAL SPINE STENOSIS: Status: ACTIVE | Noted: 2024-10-21

## 2024-10-30 ENCOUNTER — EVALUATION (OUTPATIENT)
Dept: PHYSICAL THERAPY | Facility: REHABILITATION | Age: 34
End: 2024-10-30
Payer: COMMERCIAL

## 2024-10-30 DIAGNOSIS — M25.512 ACUTE PAIN OF LEFT SHOULDER: ICD-10-CM

## 2024-10-30 DIAGNOSIS — R29.898 DECREASED RANGE OF MOTION OF NECK: ICD-10-CM

## 2024-10-30 DIAGNOSIS — M79.602 LEFT ARM PAIN: ICD-10-CM

## 2024-10-30 DIAGNOSIS — M54.2 NECK PAIN: Primary | ICD-10-CM

## 2024-10-30 DIAGNOSIS — M54.12 CERVICAL RADICULITIS: ICD-10-CM

## 2024-10-30 DIAGNOSIS — M62.838 MUSCLE SPASMS OF NECK: ICD-10-CM

## 2024-10-30 PROCEDURE — 97162 PT EVAL MOD COMPLEX 30 MIN: CPT | Performed by: PHYSICAL THERAPIST

## 2024-10-30 PROCEDURE — 97110 THERAPEUTIC EXERCISES: CPT | Performed by: PHYSICAL THERAPIST

## 2024-10-30 NOTE — PROGRESS NOTES
PT Evaluation     Today's date: 10/30/2024  Patient name: Sim Carpio  : 1990  MRN: [id number]  Referring provider: Tito Ventura DO  Dx:   Encounter Diagnosis     ICD-10-CM    1. Neck pain  M54.2 Ambulatory referral to Physical Therapy      2. Cervical radiculitis  M54.12 Ambulatory referral to Physical Therapy      3. Acute pain of left shoulder  M25.512 Ambulatory referral to Physical Therapy      4. Left arm pain  M79.602 Ambulatory referral to Physical Therapy      5. Muscle spasms of neck  M62.838 Ambulatory referral to Physical Therapy      6. Decreased range of motion of neck  R29.898 Ambulatory referral to Physical Therapy                     Assessment  Impairments: abnormal coordination, abnormal muscle firing, abnormal muscle tone, abnormal or restricted ROM, abnormal movement, activity intolerance, impaired physical strength, pain with function and poor posture     Assessment details: Pt is a pleasant 34 y.o. male presenting to outpatient physical therapy with c/o neck and left arm pain. Pt presents with pain, decreased cervical spine range of motion, decreased spinal accessory motion, and decreased tolerance to activity. Pt displays negative red flags for non-musculoskeletal pathology or central cord compression. Patient displays movement impairment diagnosis of adverse neural compression dysfunction, consistent with TBC of neck pain with radiating symptoms category. Pt is a good candidate for outpatient physical therapy, including therapeutic exercise, manual mobilizations, neuromuscular education exercises, therapeutic activity training, and would benefit from skilled physical therapy to address limitations and to achieve goals. Thank you for this referral.     Understanding of Dx/Px/POC: good     Prognosis: good    Goals  Short-Term Goals (4 weeks)   1. Patient will decrease worst rating of pain by 25% to improve quality of life.  2. Patient will increase strength by 1/2 MMT to  improve quality of life with improved efficiency of daily activities.  3. Patient will improve ROM by 25% indicating improved mobility of affected area.    Long-Term Goals (8 weeks)   1. Patient will decrease pain by 50% at worst in comparison to IE indicating significant reduction in pain and improved quality of life.  2. Patient will demonstrate strength WFL compared to IE levels indicating ability to independently manage pain symptoms to accomplish daily activities.   3. Patient will be independent with HEP with good form accomplished.      Plan  Patient would benefit from: PT eval and skilled PT  Planned modality interventions: cryotherapy and thermotherapy: hydrocollator packs    Planned therapy interventions: IADL retraining, body mechanics training, flexibility, functional ROM exercises, home exercise program, neuromuscular re-education, manual therapy, postural training, strengthening, stretching, therapeutic activities, therapeutic exercise, joint mobilization, motor coordination training, activity modification, self care, patient education and abdominal trunk stabilization    Frequency: 1x week  Duration in weeks: 8  Treatment plan discussed with: patient        Subjective Evaluation    History of Present Illness  Mechanism of injury: 10/30/24  Pt comes to therapy reporting approx 1-month history of cerivcal spine and left arm pain. 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     Notes he will feel pain in his neck and symptoms shooting down his arm when he is looking up or reaching/lifting something overhead. States he also has symptoms when lying flat on back or prone with head turned to the side (preferred sleeping position).     States symptoms radiate down lateral aspect of left arm and into the hand. Describes symptoms as numbness, tingling, pins, needles, and pain. Notes he is taking prescribed medication, with some benefit noted. Pt denies bowel or  bladder dysfunction (incontinence or retention), saddle anesthesia, fever, chills, nausea, or vomiting. Pt also denies pain at night or recent unexplained weight loss. States he had a radiograph taken of his neck, to be reviewed at follow up next week on 11/5. Reports his goals for therapy are to return to four-wheel driving, sleeping, and work duties without limitations.  Patient Goals  Patient goals for therapy: decreased pain, increased motion, return to sport/leisure activities and return to work          Objective     Neurological Testing     Sensation   Cervical/Thoracic   Left   Hypersensation: light touch    Right   Intact: light touch    Reflexes   Left   Biceps (C5/C6): trace (1+)  Brachioradialis (C6): trace (1+)  Triceps (C7): trace (1+)  Kennedy's reflex: negative    Right   Biceps (C5/C6): trace (1+)  Brachioradialis (C6): trace (1+)  Triceps (C7): trace (1+)  Kennedy's reflex: negative    Additional Neurological Details  10/30/24  UPPER EXTREMITY MYOTOMES: Intact and symmetrical bilat C2-T1  UPPER EXTREMITY DERMATOMES: Hypersensitivity reported C4-5 on left; WFL C6-T1      Active Range of Motion   Cervical/Thoracic Spine       Cervical    Flexion:  with pain Restriction level: moderate  Extension:  with pain Restriction level: moderate  Left lateral flexion:  Restriction level: minimal  Right lateral flexion:  Restriction level moderate  Left rotation:  WFL  Right rotation:  Restriction level: minimal  Left Shoulder   Flexion: 135 degrees with pain  Abduction: 120 degrees with pain    Joint Play   Joints within functional limits: C6 and C7     Pain: C3, C4 and C5     Tests   Cervical   Negative cervical distraction.     Left Shoulder   Negative ULTT1, ULTT3 and ULTT4.     Right Shoulder   Negative ULTT1, ULTT3 and ULTT4.     Additional Tests Details  10/30/24  TTP in areas of left UT, LS, 1st rib, supraspinatus, infraspinatus, lower traps, rhomboids              Precautions:   Patient Active Problem  "List   Diagnosis    Moderate persistent asthma without complication    Allergic rhinitis    Eustachian tube dysfunction    Current moderate episode of major depressive disorder without prior episode (HCC)    Fatigue    Acquired hypothyroidism    Hyperglycemia    Hypercholesterolemia    Transaminitis    Nonalcoholic steatohepatitis (HOLLINGSWORTH)    Cervical stenosis of spinal canal      No Known Allergies     Date 10/30            FOTO IE            Re-eval IE                         Manuals    T/S PA mobs                                                    Neuro Re-Ed    Cervical retractions 5\"x10            Cervical retractions with extension             Scap depress 5\"x10                                      Ther Ex    T/S chair ext             Doorway pec stretch             C/S extension SNAGS 2\"x5                                                                             Ther Activity                              Modalities                                  Access Code: [email or web address]  URL: [email or web address]  Date: 10/30/2024  Prepared by: Sivakumar Robles    Exercises  - Mid-Lower Cervical Extension SNAG with Strap  - 2 x daily - 10 reps - 2 hold  - Seated Cervical Retraction  - 2 x daily - 10 reps - 5 hold  - Standing Scapular Depression  - 2 x daily - 10 reps - 5 hold       "

## 2024-10-31 RX ORDER — DEXTROAMPHETAMINE SACCHARATE, AMPHETAMINE ASPARTATE MONOHYDRATE, DEXTROAMPHETAMINE SULFATE AND AMPHETAMINE SULFATE 6.25; 6.25; 6.25; 6.25 MG/1; MG/1; MG/1; MG/1
25 CAPSULE, EXTENDED RELEASE ORAL EVERY MORNING
COMMUNITY
Start: 2024-08-19

## 2024-11-04 ENCOUNTER — TELEPHONE (OUTPATIENT)
Dept: FAMILY MEDICINE CLINIC | Facility: CLINIC | Age: 34
End: 2024-11-04

## 2024-11-04 NOTE — TELEPHONE ENCOUNTER
Please call patient.  Let him know that his insurance denied his MRI of his neck.  He must complete 6 weeks of physical therapy and document no improvement before they will approve MRI of his neck

## 2024-11-05 ENCOUNTER — OFFICE VISIT (OUTPATIENT)
Dept: FAMILY MEDICINE CLINIC | Facility: CLINIC | Age: 34
End: 2024-11-05
Payer: COMMERCIAL

## 2024-11-05 VITALS
HEART RATE: 81 BPM | WEIGHT: 247 LBS | DIASTOLIC BLOOD PRESSURE: 84 MMHG | SYSTOLIC BLOOD PRESSURE: 130 MMHG | HEIGHT: 72 IN | BODY MASS INDEX: 33.46 KG/M2 | OXYGEN SATURATION: 98 %

## 2024-11-05 DIAGNOSIS — M62.838 MUSCLE SPASMS OF NECK: ICD-10-CM

## 2024-11-05 DIAGNOSIS — Z00.00 HEALTHCARE MAINTENANCE: ICD-10-CM

## 2024-11-05 DIAGNOSIS — M48.02 CERVICAL STENOSIS OF SPINAL CANAL: Primary | ICD-10-CM

## 2024-11-05 DIAGNOSIS — M25.512 ACUTE PAIN OF LEFT SHOULDER: ICD-10-CM

## 2024-11-05 DIAGNOSIS — M79.602 LEFT ARM PAIN: ICD-10-CM

## 2024-11-05 DIAGNOSIS — M54.2 NECK PAIN: ICD-10-CM

## 2024-11-05 DIAGNOSIS — M54.12 CERVICAL RADICULITIS: ICD-10-CM

## 2024-11-05 DIAGNOSIS — R29.898 DECREASED RANGE OF MOTION OF NECK: ICD-10-CM

## 2024-11-05 PROCEDURE — 99214 OFFICE O/P EST MOD 30 MIN: CPT | Performed by: FAMILY MEDICINE

## 2024-11-05 NOTE — PROGRESS NOTES
"Ambulatory Visit  Name: Sim Carpio      : 1990      MRN: [id number]  Encounter Provider: Tito Ventura DO  Encounter Date: 2024   Encounter department: [facility]  #1.  Cervical stenosis spinal canal and x-ray lumbar 2.  Neck pain lumbar 3.  Cervical radiculitis  #4.  Left arm pain  5.  Left shoulder pain  6.  Muscle spasm of neck  7.   decreased range of motion neck  Patient is definitely improving range of motion is back to normal.  Radiology recommended MRI however insurance denied this until 6 weeks of physical therapy completed  Will continue medication physical therapy recheck 4 weeks if still present we will again order MRI  Patient already referred to spine and pain management to follow-up with them  8.  Healthcare maintenance influenza and COVID vaccinations refused  Assessment & Plan  Cervical stenosis of spinal canal  Insurance denied MRI at this time must complete 6 weeks of physical therapy.  Patient states he is \"50%\" better with medication and therapy         Neck pain  As above       Cervical radiculitis  As above       Acute pain of left shoulder  As above       Left arm pain  As above       Muscle spasms of neck  As above       Decreased range of motion of neck  As above       Healthcare maintenance  Refused COVID influenza vaccinations          History of Present Illness     Discussed MRI which does show some narrowing.  Radiology is recommending MRI.  Insurance declined MRI until patient has at least 6 weeks of physical therapy.  Patient does state with medication therapy is \"50%\" better.          Review of Systems   Constitutional: Negative.    HENT: Negative.     Eyes: Negative.    Respiratory: Negative.     Cardiovascular: Negative.    Gastrointestinal: Negative.    Endocrine: Negative.    Genitourinary: Negative.    Musculoskeletal:         HPI   Skin: Negative.    Allergic/Immunologic: Negative.    Neurological:         HPI, pain has improved no " weakness   Hematological: Negative.    Psychiatric/Behavioral: Negative.             Objective     /84   Pulse 81   Ht 6' (1.829 m)   Wt 112 kg (247 lb)   SpO2 98%   BMI 33.50 kg/m²     Physical Exam  Vitals and nursing note reviewed.   Constitutional:       Appearance: Normal appearance.   HENT:      Head: Normocephalic and atraumatic.      Mouth/Throat:      Mouth: Mucous membranes are moist.   Cardiovascular:      Rate and Rhythm: Normal rate and regular rhythm.      Heart sounds: Normal heart sounds.   Pulmonary:      Effort: Pulmonary effort is normal.      Breath sounds: Normal breath sounds.   Musculoskeletal:      Cervical back: Neck supple. No tenderness.      Right lower leg: No edema.      Left lower leg: No edema.      Comments: Mild paravertebral muscle contraction cervical upper thoracic area negative point tenderness symmetrical range of motion   Neurological:      General: No focal deficit present.      Mental Status: He is alert.      Sensory: No sensory deficit.      Motor: No weakness.   Psychiatric:         Mood and Affect: Mood normal.

## 2024-11-05 NOTE — ASSESSMENT & PLAN NOTE
"Insurance denied MRI at this time must complete 6 weeks of physical therapy.  Patient states he is \"50%\" better with medication and therapy         "
Breath sounds clear and equal bilaterally.

## 2024-11-06 ENCOUNTER — OFFICE VISIT (OUTPATIENT)
Dept: PHYSICAL THERAPY | Facility: REHABILITATION | Age: 34
End: 2024-11-06
Payer: COMMERCIAL

## 2024-11-06 DIAGNOSIS — M62.838 MUSCLE SPASMS OF NECK: Primary | ICD-10-CM

## 2024-11-06 DIAGNOSIS — R29.898 DECREASED RANGE OF MOTION OF NECK: ICD-10-CM

## 2024-11-06 PROCEDURE — 97140 MANUAL THERAPY 1/> REGIONS: CPT

## 2024-11-06 PROCEDURE — 97110 THERAPEUTIC EXERCISES: CPT

## 2024-11-06 NOTE — PROGRESS NOTES
"Daily Note     Today's date: 2024  Patient name: Sim Carpio  : 1990  MRN: [id number]  Referring provider: Tito Ventura DO  Dx:   Encounter Diagnosis     ICD-10-CM    1. Muscle spasms of neck  M62.838       2. Decreased range of motion of neck  R29.898                      Subjective: pt reports feeling less neck pain and N/T in L UE. He noted compliance with HEP with favorable response.      Objective: See treatment diary below      Assessment: Pt tolerated treatment well. Reviewed home exercises to ensure correct form; intermittent VC/TC's to avoid compensatory movements, but appropriate response with doing so. Good tolerance with addition of manuals and exercises. Greater hypomobility in upper and lower segments of thoracic spine palpated with thoracic glides; tolerated well.  Patient demonstrated fatigue post treatment, exhibited good technique with therapeutic exercises, and would benefit from continued PT      Plan: Continue per plan of care.  Progress treatment as tolerated.         No Authorization Required   Billing Guidelines CMS    Copay/Co-Insurance  $0   Visit Limit 60v limit ppy(24-25)         Precautions:   Patient Active Problem List   Diagnosis    Moderate persistent asthma without complication    Allergic rhinitis    Eustachian tube dysfunction    Current moderate episode of major depressive disorder without prior episode (HCC)    Fatigue    Acquired hypothyroidism    Hyperglycemia    Hypercholesterolemia    Transaminitis    Nonalcoholic steatohepatitis (HOLLINGSWORTH)    Cervical stenosis of spinal canal      No Known Allergies     Date 10/30 11/6           Visit # 1 2           FOTO IE            Re-eval IE                         Manuals    T/S PA mora  TE glides                                                  Neuro Re-Ed    Cervical retractions 5\"x10 5\"x10           Cervical retractions with extension  x10           Scap depress 5\"x10 5\"x10                              " "       Ther Ex    T/S chair ext  5\"x10           Doorway pec stretch  15\"x3 corner           C/S extension SNAGS 2\"x5 2\"x10                                                                            Ther Activity    UBE  5' BW                        Modalities                                  Access Code: [email or web address]  URL: [email or web address]  Date: 10/30/2024  Prepared by: Sivakumar Robles    Exercises  - Mid-Lower Cervical Extension SNAG with Strap  - 2 x daily - 10 reps - 2 hold  - Seated Cervical Retraction  - 2 x daily - 10 reps - 5 hold  - Standing Scapular Depression  - 2 x daily - 10 reps - 5 hold       "

## 2024-11-08 ENCOUNTER — OFFICE VISIT (OUTPATIENT)
Dept: PHYSICAL THERAPY | Facility: REHABILITATION | Age: 34
End: 2024-11-08
Payer: COMMERCIAL

## 2024-11-08 DIAGNOSIS — M62.838 MUSCLE SPASMS OF NECK: Primary | ICD-10-CM

## 2024-11-08 DIAGNOSIS — M79.602 LEFT ARM PAIN: ICD-10-CM

## 2024-11-08 DIAGNOSIS — M25.512 ACUTE PAIN OF LEFT SHOULDER: ICD-10-CM

## 2024-11-08 DIAGNOSIS — M54.2 NECK PAIN: ICD-10-CM

## 2024-11-08 DIAGNOSIS — M54.12 CERVICAL RADICULITIS: ICD-10-CM

## 2024-11-08 DIAGNOSIS — R29.898 DECREASED RANGE OF MOTION OF NECK: ICD-10-CM

## 2024-11-08 PROCEDURE — 97530 THERAPEUTIC ACTIVITIES: CPT

## 2024-11-08 PROCEDURE — 97110 THERAPEUTIC EXERCISES: CPT

## 2024-11-08 PROCEDURE — 97140 MANUAL THERAPY 1/> REGIONS: CPT

## 2024-11-08 NOTE — PROGRESS NOTES
"Daily Note     Today's date: 2024  Patient name: Sim Carpio  : 1990  MRN: [id number]  Referring provider: Tito Ventura DO  Dx:   Encounter Diagnosis     ICD-10-CM    1. Muscle spasms of neck  M62.838       2. Decreased range of motion of neck  R29.898       3. Neck pain  M54.2       4. Cervical radiculitis  M54.12       5. Acute pain of left shoulder  M25.512       6. Left arm pain  M79.602                      Subjective: pt reports having soreness in L proximal UE when putting pressure through his arm. He denied N/T within the past 2 days and denied adverse reactions following last visit.       Objective: See treatment diary below      Assessment: Pt tolerated treatment well and without complaints. Good response with increased dosage. Patient demonstrated fatigue post treatment, exhibited good technique with therapeutic exercises, and would benefit from continued PT      Plan: Continue per plan of care.  Progress treatment as tolerated.       No Authorization Required   Billing Guidelines CMS    Copay/Co-Insurance  $0   Visit Limit 60v limit ppy(24-25)         Precautions:   Patient Active Problem List   Diagnosis    Moderate persistent asthma without complication    Allergic rhinitis    Eustachian tube dysfunction    Current moderate episode of major depressive disorder without prior episode (HCC)    Fatigue    Acquired hypothyroidism    Hyperglycemia    Hypercholesterolemia    Transaminitis    Nonalcoholic steatohepatitis (HOLLINGSWORTH)    Cervical stenosis of spinal canal      No Known Allergies     Date 10/30 11/6 11/8          Visit # 1 2 3          FOTO IE            Re-eval IE                         Manuals    T/S PA mobs  TE udaydes TE melissa                                                 Neuro Re-Ed    Cervical retractions 5\"x10 5\"x10 5\"2x10          Cervical retractions with extension  x10 2x10          Scap depress 5\"x10 5\"x10 5\"2x10          Prone I/T/Y's   nv                " "       Ther Ex    T/S chair ext  5\"x10 10\"x5 supine foam upper/  lower t/s          Doorway pec stretch  15\"x3 corner Corner  30\"x3          C/S extension SNAGS 2\"x5 2\"x10           T/s foam series   1 min ea                                                              Ther Activity    UBE  5' BW 5' BW                       Modalities                                  Access Code: [email or web address]  URL: [email or web address]  Date: 10/30/2024  Prepared by: Sivakumar Robles    Exercises  - Mid-Lower Cervical Extension SNAG with Strap  - 2 x daily - 10 reps - 2 hold  - Seated Cervical Retraction  - 2 x daily - 10 reps - 5 hold  - Standing Scapular Depression  - 2 x daily - 10 reps - 5 hold         "

## 2024-11-20 ENCOUNTER — OFFICE VISIT (OUTPATIENT)
Dept: PHYSICAL THERAPY | Facility: REHABILITATION | Age: 34
End: 2024-11-20
Payer: COMMERCIAL

## 2024-11-20 DIAGNOSIS — M54.2 NECK PAIN: ICD-10-CM

## 2024-11-20 DIAGNOSIS — R29.898 DECREASED RANGE OF MOTION OF NECK: ICD-10-CM

## 2024-11-20 DIAGNOSIS — M79.602 LEFT ARM PAIN: ICD-10-CM

## 2024-11-20 DIAGNOSIS — M25.512 ACUTE PAIN OF LEFT SHOULDER: Primary | ICD-10-CM

## 2024-11-20 DIAGNOSIS — M62.838 MUSCLE SPASMS OF NECK: ICD-10-CM

## 2024-11-20 DIAGNOSIS — M54.12 CERVICAL RADICULITIS: ICD-10-CM

## 2024-11-20 PROCEDURE — 97110 THERAPEUTIC EXERCISES: CPT

## 2024-11-20 PROCEDURE — 97530 THERAPEUTIC ACTIVITIES: CPT

## 2024-11-20 PROCEDURE — 97140 MANUAL THERAPY 1/> REGIONS: CPT

## 2024-11-20 NOTE — PROGRESS NOTES
"Daily Note     Today's date: 2024  Patient name: Sim Carpio  : 1990  MRN: [id number]  Referring provider: Tito Ventura DO  Dx:   Encounter Diagnosis     ICD-10-CM    1. Acute pain of left shoulder  M25.512       2. Muscle spasms of neck  M62.838       3. Neck pain  M54.2       4. Cervical radiculitis  M54.12       5. Left arm pain  M79.602                      Subjective: pt presents after 2 week hiatus reporting decreased neck pain and radiating UE sx's He noted pain radiates from L UT into shoulder only.      Objective: See treatment diary below      Assessment: Pt tolerated treatment well. Good response with addition of prone scap exercises. Issued/updated HEP with no questions or concerns expressed.  Patient demonstrated fatigue post treatment, exhibited good technique with therapeutic exercises, and would benefit from continued PT.      Plan: Continue per plan of care.  Progress treatment as tolerated.         No Authorization Required   Billing Guidelines CMS    Copay/Co-Insurance  $0   Visit Limit 60v limit ppy(24-25)         Precautions:   Patient Active Problem List   Diagnosis    Moderate persistent asthma without complication    Allergic rhinitis    Eustachian tube dysfunction    Current moderate episode of major depressive disorder without prior episode (HCC)    Fatigue    Acquired hypothyroidism    Hyperglycemia    Hypercholesterolemia    Transaminitis    Nonalcoholic steatohepatitis (HOLLINGSWORTH)    Cervical stenosis of spinal canal      No Known Allergies     Date 10/30 11/6 11/8 11/20         Visit # 1 2 3 4         FOTO IE            Re-eval IE                         Manuals    T/S PA mobs  TE glides TE glides TE glides                                                Neuro Re-Ed    Cervical retractions 5\"x10 5\"x10 5\"2x10 5\"2x10         Cervical retractions with extension  x10 2x10          Scap depress 5\"x10 5\"x10 5\"2x10          Prone I/T/Y's   nv 5\"x10 ea               " "       Ther Ex    T/S chair ext  5\"x10 10\"x5 supine foam upper/  lower t/s 10\"x10         Doorway pec stretch  15\"x3 corner Corner  30\"x3 30\"x3         C/S extension SNAGS 2\"x5 2\"x10           T/s foam series   1 min ea  1 min ea                                                             Ther Activity    UBE  5' BW 5' BW 3' ea                      Modalities                                  Access Code: [email or web address]  URL: [email or web address]"/  Date: 10/30/2024  Prepared by: Sivakumar Robles    Exercises  - Mid-Lower Cervical Extension SNAG with Strap  - 2 x daily - 10 reps - 2 hold  - Seated Cervical Retraction  - 2 x daily - 10 reps - 5 hold  - Standing Scapular Depression  - 2 x daily - 10 reps - 5 hold         Access Code: [email or web address]  URL: [email or web address]"[employer]  Date: 11/20/2024  Prepared by: Delma Ruiz    Exercises  - Mid-Lower Cervical Extension SNAG with Strap  - 2 x daily - 10 reps - 2 hold  - Seated Cervical Retraction  - 2 x daily - 10 reps - 5 hold  - Prone Shoulder Extension  - 3 x weekly - 3 sets - 10 reps  - Prone Single Arm Shoulder Horizontal Abduction with Scapular Retraction and Palm Down  - 3 x weekly - 3 sets - 10 reps  - Prone Single Arm Shoulder Scaption Palm Down  - 3 x weekly - 3 sets - 10 reps  - Corner Pec Major Stretch  - 3 x weekly - 3 sets - 10 reps  "

## 2025-04-08 DIAGNOSIS — J45.40 MODERATE PERSISTENT ASTHMA WITHOUT COMPLICATION: ICD-10-CM

## 2025-04-09 RX ORDER — FLUTICASONE PROPIONATE AND SALMETEROL 500; 50 UG/1; UG/1
1 POWDER RESPIRATORY (INHALATION) 2 TIMES DAILY
Qty: 60 BLISTER | Refills: 5 | Status: SHIPPED | OUTPATIENT
Start: 2025-04-09 | End: 2025-10-06

## 2025-04-09 NOTE — TELEPHONE ENCOUNTER
Requested Prescriptions     Pending Prescriptions Disp Refills    Fluticasone-Salmeterol (Advair) 500-50 mcg/dose inhaler [Pharmacy Med Name: FLUTICASONE-SALMETEROL 500- 500-50 Aerosol]  6     Sig: INHALE 1 PUFF 2 (TWO) TIMES A DAY RINSE MOUTH AFTER USE.

## 2025-06-06 ENCOUNTER — TELEPHONE (OUTPATIENT)
Age: 35
End: 2025-06-06

## 2025-06-06 NOTE — TELEPHONE ENCOUNTER
Holli called for general information, for services with [facility].    The patient was not present at the time of the call, and the patient was not added to the wait list at this time.    The writer did offer to email over outside resources, and the [facility] information was provided for Holli.

## 2025-06-06 NOTE — TELEPHONE ENCOUNTER
Patient's wife calling in for information on becoming a new patient.     Advised that patient can be added to the wait list.    Wife is concerned that patient won't be seen soon enough since his last psychiatrist took him off of his medications cold turkey.     Writer suggested getting a referral from patient's PCP and seeing if PCP can help with medication in the mean time.

## 2025-06-19 ENCOUNTER — TELEPHONE (OUTPATIENT)
Dept: FAMILY MEDICINE CLINIC | Facility: CLINIC | Age: 35
End: 2025-06-19

## 2025-06-19 NOTE — TELEPHONE ENCOUNTER
Patient did not show up for scheduled follow-up today, 6/19/2025.  Please have patient complete blood work that was ordered and reschedule follow-up in the next 1 to 2 weeks

## 2025-06-23 ENCOUNTER — APPOINTMENT (OUTPATIENT)
Dept: LAB | Age: 35
End: 2025-06-23
Payer: COMMERCIAL

## 2025-06-23 DIAGNOSIS — E03.9 ACQUIRED HYPOTHYROIDISM: ICD-10-CM

## 2025-06-23 DIAGNOSIS — J30.9 ALLERGIC RHINITIS, UNSPECIFIED SEASONALITY, UNSPECIFIED TRIGGER: ICD-10-CM

## 2025-06-23 DIAGNOSIS — K75.81 NONALCOHOLIC STEATOHEPATITIS (NASH): ICD-10-CM

## 2025-06-23 DIAGNOSIS — R73.9 HYPERGLYCEMIA: ICD-10-CM

## 2025-06-23 DIAGNOSIS — F32.1 CURRENT MODERATE EPISODE OF MAJOR DEPRESSIVE DISORDER WITHOUT PRIOR EPISODE (HCC): ICD-10-CM

## 2025-06-23 DIAGNOSIS — R74.01 TRANSAMINITIS: ICD-10-CM

## 2025-06-23 DIAGNOSIS — J45.40 MODERATE PERSISTENT ASTHMA WITHOUT COMPLICATION: ICD-10-CM

## 2025-06-23 DIAGNOSIS — E78.00 HYPERCHOLESTEROLEMIA: ICD-10-CM

## 2025-06-23 LAB
ALBUMIN SERPL BCG-MCNC: 4.2 G/DL (ref 3.5–5)
ALP SERPL-CCNC: 73 U/L (ref 34–104)
ALT SERPL W P-5'-P-CCNC: 23 U/L (ref 7–52)
ANION GAP SERPL CALCULATED.3IONS-SCNC: 6 MMOL/L (ref 4–13)
AST SERPL W P-5'-P-CCNC: 24 U/L (ref 13–39)
BILIRUB SERPL-MCNC: 0.6 MG/DL (ref 0.2–1)
BUN SERPL-MCNC: 15 MG/DL (ref 5–25)
CALCIUM SERPL-MCNC: 8.8 MG/DL (ref 8.4–10.2)
CHLORIDE SERPL-SCNC: 108 MMOL/L (ref 96–108)
CHOLEST SERPL-MCNC: 164 MG/DL (ref ?–200)
CO2 SERPL-SCNC: 27 MMOL/L (ref 21–32)
CREAT SERPL-MCNC: 0.88 MG/DL (ref 0.6–1.3)
ERYTHROCYTE [DISTWIDTH] IN BLOOD BY AUTOMATED COUNT: 12.7 % (ref 11.6–15.1)
EST. AVERAGE GLUCOSE BLD GHB EST-MCNC: 111 MG/DL
GFR SERPL CREATININE-BSD FRML MDRD: 110 ML/MIN/1.73SQ M
GLUCOSE P FAST SERPL-MCNC: 96 MG/DL (ref 65–99)
HBA1C MFR BLD: 5.5 %
HCT VFR BLD AUTO: 41.9 % (ref 36.5–49.3)
HDLC SERPL-MCNC: 64 MG/DL
HGB BLD-MCNC: 14.4 G/DL (ref 12–17)
LDLC SERPL CALC-MCNC: 80 MG/DL (ref 0–100)
MCH RBC QN AUTO: 33.3 PG (ref 26.8–34.3)
MCHC RBC AUTO-ENTMCNC: 34.4 G/DL (ref 31.4–37.4)
MCV RBC AUTO: 97 FL (ref 82–98)
PLATELET # BLD AUTO: 245 THOUSANDS/UL (ref 149–390)
PMV BLD AUTO: 10.2 FL (ref 8.9–12.7)
POTASSIUM SERPL-SCNC: 4.6 MMOL/L (ref 3.5–5.3)
PROT SERPL-MCNC: 6.4 G/DL (ref 6.4–8.4)
RBC # BLD AUTO: 4.33 MILLION/UL (ref 3.88–5.62)
SODIUM SERPL-SCNC: 141 MMOL/L (ref 135–147)
TRIGL SERPL-MCNC: 99 MG/DL (ref ?–150)
TSH SERPL DL<=0.05 MIU/L-ACNC: 3.5 UIU/ML (ref 0.45–4.5)
WBC # BLD AUTO: 7.02 THOUSAND/UL (ref 4.31–10.16)

## 2025-06-23 PROCEDURE — 80061 LIPID PANEL: CPT

## 2025-06-23 PROCEDURE — 85027 COMPLETE CBC AUTOMATED: CPT

## 2025-06-23 PROCEDURE — 84443 ASSAY THYROID STIM HORMONE: CPT

## 2025-06-23 PROCEDURE — 83036 HEMOGLOBIN GLYCOSYLATED A1C: CPT

## 2025-06-23 PROCEDURE — 36415 COLL VENOUS BLD VENIPUNCTURE: CPT

## 2025-06-23 PROCEDURE — 80053 COMPREHEN METABOLIC PANEL: CPT

## 2025-06-24 RX ORDER — LEVOTHYROXINE SODIUM 175 UG/1
175 TABLET ORAL
Qty: 30 TABLET | Refills: 5 | Status: SHIPPED | OUTPATIENT
Start: 2025-06-24

## 2025-07-01 ENCOUNTER — OFFICE VISIT (OUTPATIENT)
Dept: FAMILY MEDICINE CLINIC | Facility: CLINIC | Age: 35
End: 2025-07-01
Payer: COMMERCIAL

## 2025-07-01 ENCOUNTER — TELEPHONE (OUTPATIENT)
Age: 35
End: 2025-07-01

## 2025-07-01 VITALS
BODY MASS INDEX: 34.95 KG/M2 | HEART RATE: 85 BPM | SYSTOLIC BLOOD PRESSURE: 120 MMHG | HEIGHT: 72 IN | OXYGEN SATURATION: 97 % | WEIGHT: 258 LBS | DIASTOLIC BLOOD PRESSURE: 82 MMHG

## 2025-07-01 DIAGNOSIS — J45.40 MODERATE PERSISTENT ASTHMA WITHOUT COMPLICATION: ICD-10-CM

## 2025-07-01 DIAGNOSIS — R74.01 TRANSAMINITIS: ICD-10-CM

## 2025-07-01 DIAGNOSIS — E78.00 HYPERCHOLESTEROLEMIA: ICD-10-CM

## 2025-07-01 DIAGNOSIS — R73.9 HYPERGLYCEMIA: ICD-10-CM

## 2025-07-01 DIAGNOSIS — E03.9 ACQUIRED HYPOTHYROIDISM: ICD-10-CM

## 2025-07-01 DIAGNOSIS — H69.90 DYSFUNCTION OF EUSTACHIAN TUBE, UNSPECIFIED LATERALITY: ICD-10-CM

## 2025-07-01 DIAGNOSIS — J30.9 ALLERGIC RHINITIS, UNSPECIFIED SEASONALITY, UNSPECIFIED TRIGGER: Primary | ICD-10-CM

## 2025-07-01 DIAGNOSIS — F32.1 CURRENT MODERATE EPISODE OF MAJOR DEPRESSIVE DISORDER WITHOUT PRIOR EPISODE (HCC): ICD-10-CM

## 2025-07-01 DIAGNOSIS — R09.81 CHRONIC NASAL CONGESTION: ICD-10-CM

## 2025-07-01 DIAGNOSIS — K75.81 NONALCOHOLIC STEATOHEPATITIS (NASH): ICD-10-CM

## 2025-07-01 PROCEDURE — 99214 OFFICE O/P EST MOD 30 MIN: CPT | Performed by: FAMILY MEDICINE

## 2025-07-01 RX ORDER — AZELASTINE 1 MG/ML
2 SPRAY, METERED NASAL 2 TIMES DAILY
Qty: 30 ML | Refills: 5 | Status: SHIPPED | OUTPATIENT
Start: 2025-07-01

## 2025-07-01 NOTE — ASSESSMENT & PLAN NOTE
Stable, lungs are clear  Orders:  •  CBC; Future  •  Comprehensive metabolic panel; Future  •  Hemoglobin A1C; Future  •  Lipid Panel with Direct LDL reflex; Future  •  TSH, 3rd generation with Free T4 reflex; Future  •  UA (URINE) with reflex to Scope; Future

## 2025-07-01 NOTE — ASSESSMENT & PLAN NOTE
Stable on levothyroxine 175 mcg daily  Orders:  •  CBC; Future  •  Comprehensive metabolic panel; Future  •  Hemoglobin A1C; Future  •  Lipid Panel with Direct LDL reflex; Future  •  TSH, 3rd generation with Free T4 reflex; Future  •  UA (URINE) with reflex to Scope; Future

## 2025-07-01 NOTE — ASSESSMENT & PLAN NOTE
Astelin nasal spray as ordered, consult ENT  Orders:  •  azelastine (ASTELIN) 0.1 % nasal spray; 2 sprays into each nostril 2 (two) times a day  •  Ambulatory Referral to Otolaryngology; Future  •  CBC; Future  •  Comprehensive metabolic panel; Future  •  Hemoglobin A1C; Future  •  Lipid Panel with Direct LDL reflex; Future  •  TSH, 3rd generation with Free T4 reflex; Future  •  UA (URINE) with reflex to Scope; Future

## 2025-07-01 NOTE — PATIENT INSTRUCTIONS
[facility] should call you.  If there is a delay in treatment I have listed providers below to help you  Start Astelin nasal spray 2 sprays both nostrils twice daily  Follow with ENT for chronic nasal congestion  Continue present therapy  Return in 6 months for office visit and blood work sooner if needed    Florinda Trenton   [phone]  [address]  [address]  [place] [address]    [facility]  [address]   [place] [address]  [phone]    [facility]   [address] [address]  [phone]    [facility]   [phone]  [address]    [address]  [place] [address]     [facility]  [address]  [address]  [place] [address]    [facility]   [address]   [address]  [place] [address]  [phone]    [facility]  [address]   [address]  [place] [address]  [phone]    [facility]   [address]  [address]  [place] [address]  [phone]    [facility]  [address]  [place] [address]  [phone]    [facility]  [address]  [place] [address]  [phone]      [facility]   [address]  [place] [address]   [phone]    [facility]   [address] [place] [address]  [phone]

## 2025-07-01 NOTE — TELEPHONE ENCOUNTER
Patient's wife called in regard to patient's STAT referral. Writer checked chart and did see the referral. Writer informed wife is not on communication consent form and we would need to speak to patient to schedule. Or patient can fill out the consent form and we would be able to assist. Wife understood and writer sent form via [employer] to patient. Patient was not with wife at time of call. Writer can then assist with forwarding to Intake for assistance.   Wife will call back once patient has signed the form.

## 2025-07-01 NOTE — TELEPHONE ENCOUNTER
Pt mother called in to see if she can schedule pt with np appt. Pt is going to call tomorrow to set up an appt

## 2025-07-01 NOTE — ASSESSMENT & PLAN NOTE
As above  Orders:  •  azelastine (ASTELIN) 0.1 % nasal spray; 2 sprays into each nostril 2 (two) times a day  •  Ambulatory Referral to Otolaryngology; Future  •  CBC; Future  •  Comprehensive metabolic panel; Future  •  Hemoglobin A1C; Future  •  Lipid Panel with Direct LDL reflex; Future  •  TSH, 3rd generation with Free T4 reflex; Future  •  UA (URINE) with reflex to Scope; Future

## 2025-07-01 NOTE — ASSESSMENT & PLAN NOTE
"Will consult [facility].  I will put a \"stat\" on the referral patient does have his medical records from his last psychiatrist to take with him.  I am also going to provide a list of behavioral health practitioners that are excepting patients  Orders:  •  Ambulatory referral to Psych Services; Future  •  CBC; Future  •  Comprehensive metabolic panel; Future  •  Hemoglobin A1C; Future  •  Lipid Panel with Direct LDL reflex; Future  •  TSH, 3rd generation with Free T4 reflex; Future  •  UA (URINE) with reflex to Scope; Future  "

## 2025-07-01 NOTE — ASSESSMENT & PLAN NOTE
LFTs are normal continue to monitor  Orders:  •  CBC; Future  •  Comprehensive metabolic panel; Future  •  Hemoglobin A1C; Future  •  Lipid Panel with Direct LDL reflex; Future  •  TSH, 3rd generation with Free T4 reflex; Future  •  UA (URINE) with reflex to Scope; Future

## 2025-07-01 NOTE — PROGRESS NOTES
Name: Sim Carpio      : 1990      MRN: [id number]  Encounter Provider: Tito Ventura DO  Encounter Date: 2025   Encounter department: [facility] PRIMARY [facility]  Return in 6 months for office visit blood work sooner if needed  :  Assessment & Plan  Allergic rhinitis, unspecified seasonality, unspecified trigger  Astelin nasal spray as ordered, consult ENT  Orders:  •  azelastine (ASTELIN) 0.1 % nasal spray; 2 sprays into each nostril 2 (two) times a day  •  Ambulatory Referral to Otolaryngology; Future  •  CBC; Future  •  Comprehensive metabolic panel; Future  •  Hemoglobin A1C; Future  •  Lipid Panel with Direct LDL reflex; Future  •  TSH, 3rd generation with Free T4 reflex; Future  •  UA (URINE) with reflex to Scope; Future    Chronic nasal congestion  As above  Orders:  •  Ambulatory Referral to Otolaryngology; Future  •  CBC; Future  •  Comprehensive metabolic panel; Future  •  Hemoglobin A1C; Future  •  Lipid Panel with Direct LDL reflex; Future  •  TSH, 3rd generation with Free T4 reflex; Future  •  UA (URINE) with reflex to Scope; Future    Dysfunction of Eustachian tube, unspecified laterality  As above  Orders:  •  azelastine (ASTELIN) 0.1 % nasal spray; 2 sprays into each nostril 2 (two) times a day  •  Ambulatory Referral to Otolaryngology; Future  •  CBC; Future  •  Comprehensive metabolic panel; Future  •  Hemoglobin A1C; Future  •  Lipid Panel with Direct LDL reflex; Future  •  TSH, 3rd generation with Free T4 reflex; Future  •  UA (URINE) with reflex to Scope; Future    Moderate persistent asthma without complication  Stable, lungs are clear  Orders:  •  CBC; Future  •  Comprehensive metabolic panel; Future  •  Hemoglobin A1C; Future  •  Lipid Panel with Direct LDL reflex; Future  •  TSH, 3rd generation with Free T4 reflex; Future  •  UA (URINE) with reflex to Scope; Future    Nonalcoholic steatohepatitis ([place])  LFTs are normal continue to monitor  Orders:  •  CBC;  "Future  •  Comprehensive metabolic panel; Future  •  Hemoglobin A1C; Future  •  Lipid Panel with Direct LDL reflex; Future  •  TSH, 3rd generation with Free T4 reflex; Future  •  UA (URINE) with reflex to Scope; Future    Acquired hypothyroidism  Stable on levothyroxine 175 mcg daily  Orders:  •  CBC; Future  •  Comprehensive metabolic panel; Future  •  Hemoglobin A1C; Future  •  Lipid Panel with Direct LDL reflex; Future  •  TSH, 3rd generation with Free T4 reflex; Future  •  UA (URINE) with reflex to Scope; Future    Current moderate episode of major depressive disorder without prior episode (HCC)  Will consult [facility].  I will put a \"stat\" on the referral patient does have his medical records from his last psychiatrist to take with him.  I am also going to provide a list of behavioral health practitioners that are excepting patients  Orders:  •  Ambulatory referral to Psych Services; Future  •  CBC; Future  •  Comprehensive metabolic panel; Future  •  Hemoglobin A1C; Future  •  Lipid Panel with Direct LDL reflex; Future  •  TSH, 3rd generation with Free T4 reflex; Future  •  UA (URINE) with reflex to Scope; Future    Hypercholesterolemia  Diet controlled  Orders:  •  CBC; Future  •  Comprehensive metabolic panel; Future  •  Hemoglobin A1C; Future  •  Lipid Panel with Direct LDL reflex; Future  •  TSH, 3rd generation with Free T4 reflex; Future  •  UA (URINE) with reflex to Scope; Future    Hyperglycemia  Diet controlled  Orders:  •  CBC; Future  •  Comprehensive metabolic panel; Future  •  Hemoglobin A1C; Future  •  Lipid Panel with Direct LDL reflex; Future  •  TSH, 3rd generation with Free T4 reflex; Future  •  UA (URINE) with reflex to Scope; Future    Transaminitis  LFTs are normal continue to monitor  Orders:  •  CBC; Future  •  Comprehensive metabolic panel; Future  •  Hemoglobin A1C; Future  •  Lipid Panel with Direct LDL reflex; Future  •  TSH, 3rd generation with Free T4 reflex; " "Future  •  UA (URINE) with reflex to Scope; Future           History of Present Illness   Patient is chronic nasal congestion would like to see ENT, we will do this for him.  In addition patient and his psychiatrist a \"falling out\" patient would like a new referral to another psychiatrist I will refer him to [facility].  According to his family if we put \"stat\" he will get in sooner.  I explained I will gladly do this but I am not optimistic.      Review of Systems   Constitutional: Negative.    HENT:          HPI   Eyes: Negative.    Respiratory: Negative.     Cardiovascular: Negative.    Gastrointestinal: Negative.    Endocrine: Negative.    Genitourinary: Negative.    Musculoskeletal: Negative.    Skin: Negative.    Allergic/Immunologic: Positive for environmental allergies.   Neurological: Negative.    Hematological: Negative.    Psychiatric/Behavioral:          HPI       Objective   /82 (BP Location: Right arm, Patient Position: Sitting, Cuff Size: Standard)   Pulse 85   Ht 6' (1.829 m)   Wt 117 kg (258 lb)   SpO2 97%   BMI 34.99 kg/m²      Physical Exam  Vitals and nursing note reviewed.   Constitutional:       Appearance: Normal appearance.      Comments: Muscular 35-year-old white male   HENT:      Head: Normocephalic and atraumatic.      Right Ear: Tympanic membrane normal.      Left Ear: Tympanic membrane normal.      Nose:      Comments: Positive allergic turbinates, very congested unable to view much after inferior turbinates     Mouth/Throat:      Mouth: Mucous membranes are moist.      Pharynx: Oropharynx is clear. No oropharyngeal exudate or posterior oropharyngeal erythema.     Eyes:      General: No scleral icterus.      Cardiovascular:      Rate and Rhythm: Normal rate and regular rhythm.      Heart sounds: Normal heart sounds.   Pulmonary:      Effort: Pulmonary effort is normal.      Breath sounds: Normal breath sounds.   Abdominal:      Palpations: Abdomen is soft. "      Tenderness: There is no abdominal tenderness.     Musculoskeletal:      Cervical back: Neck supple.      Right lower leg: No edema.      Left lower leg: No edema.   Lymphadenopathy:      Cervical: No cervical adenopathy.     Skin:     General: Skin is warm and dry.     Neurological:      General: No focal deficit present.      Mental Status: He is alert and oriented to person, place, and time.      Cranial Nerves: No cranial nerve deficit.     Psychiatric:         Mood and Affect: Mood normal.         Behavior: Behavior normal.         Thought Content: Thought content normal.         Judgment: Judgment normal.

## 2025-07-03 ENCOUNTER — TELEPHONE (OUTPATIENT)
Age: 35
End: 2025-07-03

## 2025-07-03 NOTE — TELEPHONE ENCOUNTER
Contacted patient in regards to ASAP/STAT Referral in attempts to verify patient's needs of services and schedule soonest available appointment.
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
## 2025-07-18 ENCOUNTER — TELEPHONE (OUTPATIENT)
Age: 35
End: 2025-07-18

## 2025-07-18 DIAGNOSIS — F32.1 CURRENT MODERATE EPISODE OF MAJOR DEPRESSIVE DISORDER WITHOUT PRIOR EPISODE (HCC): ICD-10-CM

## 2025-07-18 NOTE — TELEPHONE ENCOUNTER
Pts wife called in seeking the email address or physical address to get the patient previous chart notes from his old Dr  sent over prior to his Np appt. Writer provided the office address and the [email or web address] email for her to send over the office notes.

## 2025-07-21 RX ORDER — BUPROPION HYDROCHLORIDE 150 MG/1
150 TABLET ORAL EVERY MORNING
Qty: 90 TABLET | Refills: 0 | Status: SHIPPED | OUTPATIENT
Start: 2025-07-21

## 2025-08-15 ENCOUNTER — HOSPITAL ENCOUNTER (OUTPATIENT)
Dept: CT IMAGING | Facility: HOSPITAL | Age: 35
Discharge: HOME | End: 2025-08-15
Attending: OTOLARYNGOLOGY
Payer: COMMERCIAL

## 2025-08-21 ENCOUNTER — TELEMEDICINE (OUTPATIENT)
Dept: PSYCHIATRY | Facility: CLINIC | Age: 35
End: 2025-08-21
Payer: COMMERCIAL

## 2025-08-21 DIAGNOSIS — F90.0 ATTENTION DEFICIT HYPERACTIVITY DISORDER (ADHD), PREDOMINANTLY INATTENTIVE TYPE: ICD-10-CM

## 2025-08-21 DIAGNOSIS — F41.1 GAD (GENERALIZED ANXIETY DISORDER): ICD-10-CM

## 2025-08-21 DIAGNOSIS — F33.1 MDD (MAJOR DEPRESSIVE DISORDER), RECURRENT EPISODE, MODERATE (HCC): Primary | ICD-10-CM

## 2025-08-21 DIAGNOSIS — F19.10 POLYSUBSTANCE ABUSE (HCC): ICD-10-CM

## 2025-08-21 DIAGNOSIS — R53.83 FATIGUE, UNSPECIFIED TYPE: ICD-10-CM

## 2025-08-21 DIAGNOSIS — Z79.899 MEDICAL MARIJUANA USE: ICD-10-CM

## 2025-08-21 PROCEDURE — 90792 PSYCH DIAG EVAL W/MED SRVCS: CPT | Performed by: PSYCHIATRY & NEUROLOGY

## 2025-08-21 RX ORDER — BUPROPION HYDROCHLORIDE 150 MG/1
150 TABLET ORAL EVERY MORNING
Qty: 30 TABLET | Refills: 2 | Status: SHIPPED | OUTPATIENT
Start: 2025-08-21

## 2025-08-21 RX ORDER — ARIPIPRAZOLE 5 MG/1
5 TABLET ORAL DAILY
Qty: 30 TABLET | Refills: 2 | Status: SHIPPED | OUTPATIENT
Start: 2025-08-21

## 2025-08-21 RX ORDER — ESCITALOPRAM OXALATE 20 MG/1
20 TABLET ORAL DAILY
Qty: 30 TABLET | Refills: 2 | Status: SHIPPED | OUTPATIENT
Start: 2025-08-21